# Patient Record
Sex: FEMALE | Race: WHITE | NOT HISPANIC OR LATINO | Employment: FULL TIME | URBAN - METROPOLITAN AREA
[De-identification: names, ages, dates, MRNs, and addresses within clinical notes are randomized per-mention and may not be internally consistent; named-entity substitution may affect disease eponyms.]

---

## 2017-04-27 ENCOUNTER — GENERIC CONVERSION - ENCOUNTER (OUTPATIENT)
Dept: OTHER | Facility: OTHER | Age: 57
End: 2017-04-27

## 2017-05-22 ENCOUNTER — TRANSCRIBE ORDERS (OUTPATIENT)
Dept: ADMINISTRATIVE | Facility: HOSPITAL | Age: 57
End: 2017-05-22

## 2017-05-22 ENCOUNTER — ALLSCRIPTS OFFICE VISIT (OUTPATIENT)
Dept: OTHER | Facility: OTHER | Age: 57
End: 2017-05-22

## 2017-05-22 DIAGNOSIS — R07.9 CHEST PAIN, UNSPECIFIED: Primary | ICD-10-CM

## 2017-05-22 DIAGNOSIS — R07.9 CHEST PAIN: ICD-10-CM

## 2017-05-25 ENCOUNTER — HOSPITAL ENCOUNTER (OUTPATIENT)
Dept: NON INVASIVE DIAGNOSTICS | Facility: CLINIC | Age: 57
Discharge: HOME | End: 2017-05-25
Payer: COMMERCIAL

## 2017-05-25 DIAGNOSIS — R07.9 CHEST PAIN, UNSPECIFIED: ICD-10-CM

## 2017-05-25 DIAGNOSIS — R07.9 CHEST PAIN: ICD-10-CM

## 2017-05-25 PROCEDURE — 93306 TTE W/DOPPLER COMPLETE: CPT

## 2017-05-25 PROCEDURE — 93017 CV STRESS TEST TRACING ONLY: CPT

## 2017-05-26 LAB
ARRHY DURING EX: NORMAL
CHEST PAIN STATEMENT: NORMAL
MAX DIASTOLIC BP: 90 MMHG
MAX HEART RATE: 162 BPM
MAX PREDICTED HEART RATE: 162 BPM
MAX. SYSTOLIC BP: 170 MMHG
PROTOCOL NAME: NORMAL
REASON FOR TERMINATION: NORMAL
TARGET HR FORMULA: NORMAL
TIME IN EXERCISE PHASE: 466 S

## 2017-11-14 DIAGNOSIS — Z12.31 ENCOUNTER FOR SCREENING MAMMOGRAM FOR MALIGNANT NEOPLASM OF BREAST: ICD-10-CM

## 2017-12-16 ENCOUNTER — GENERIC CONVERSION - ENCOUNTER (OUTPATIENT)
Dept: OBGYN CLINIC | Facility: CLINIC | Age: 57
End: 2017-12-16

## 2018-01-13 VITALS
BODY MASS INDEX: 32.66 KG/M2 | HEIGHT: 62 IN | DIASTOLIC BLOOD PRESSURE: 80 MMHG | WEIGHT: 177.5 LBS | HEART RATE: 84 BPM | SYSTOLIC BLOOD PRESSURE: 120 MMHG

## 2018-02-10 ENCOUNTER — OFFICE VISIT (OUTPATIENT)
Dept: OBGYN CLINIC | Facility: CLINIC | Age: 58
End: 2018-02-10
Payer: COMMERCIAL

## 2018-02-10 VITALS
SYSTOLIC BLOOD PRESSURE: 120 MMHG | HEIGHT: 62 IN | WEIGHT: 174 LBS | DIASTOLIC BLOOD PRESSURE: 78 MMHG | BODY MASS INDEX: 32.02 KG/M2

## 2018-02-10 DIAGNOSIS — Z01.419 GYNECOLOGIC EXAM NORMAL: Primary | ICD-10-CM

## 2018-02-10 DIAGNOSIS — N95.1 MENOPAUSAL SYMPTOMS: ICD-10-CM

## 2018-02-10 PROBLEM — R94.31 ABNORMAL EKG: Status: ACTIVE | Noted: 2017-05-22

## 2018-02-10 PROBLEM — R07.9 CHEST PAIN SYNDROME: Status: ACTIVE | Noted: 2017-05-22

## 2018-02-10 PROCEDURE — S0612 ANNUAL GYNECOLOGICAL EXAMINA: HCPCS | Performed by: PHYSICIAN ASSISTANT

## 2018-02-10 RX ORDER — ESTRADIOL 0.07 MG/D
1 FILM, EXTENDED RELEASE TRANSDERMAL
COMMUNITY
Start: 2017-06-07 | End: 2018-02-10 | Stop reason: SDUPTHER

## 2018-02-10 RX ORDER — ACYCLOVIR AND HYDROCORTISONE 50; 10 MG/G; MG/G
CREAM TOPICAL
COMMUNITY
Start: 2017-11-18

## 2018-02-10 RX ORDER — ALENDRONATE SODIUM 70 MG/1
TABLET ORAL
COMMUNITY
Start: 2017-12-21 | End: 2018-02-10 | Stop reason: SDUPTHER

## 2018-02-10 RX ORDER — ESTRADIOL 0.07 MG/D
1 FILM, EXTENDED RELEASE TRANSDERMAL 2 TIMES WEEKLY
Qty: 8 PATCH | Refills: 11 | Status: SHIPPED | OUTPATIENT
Start: 2018-02-12 | End: 2019-03-28 | Stop reason: SDUPTHER

## 2018-02-10 RX ORDER — ALENDRONATE SODIUM 70 MG/1
70 TABLET ORAL
Qty: 4 TABLET | Refills: 11 | Status: SHIPPED | OUTPATIENT
Start: 2018-02-10 | End: 2019-03-28

## 2018-02-10 NOTE — PROGRESS NOTES
Assessment/Plan:    No problem-specific Assessment & Plan notes found for this encounter  Diagnoses and all orders for this visit:    Gynecologic exam normal    Menopausal symptoms  -     estradiol (VIVELLE-DOT) 0 075 MG/24HR; Place 1 patch on the skin 2 (two) times a week  -     alendronate (FOSAMAX) 70 mg tablet; Take 1 tablet (70 mg total) by mouth every 7 days    Other orders  -     Discontinue: estradiol (VIVELLE-DOT) 0 075 MG/24HR; Place 1 patch on the skin  -     Discontinue: alendronate (FOSAMAX) 70 mg tablet;   -     XERESE 5-1 % CREA; Subjective:      Patient ID: Ryan Funk is a 60 y o  female  Pt for annual GYN exam   sx under control on current tx  No vaginal d/c or Gyn complaints  Occas vaginal irritation that resolves w hydrocortisone  Also w occas folliculitis  No problems w Ic  Pt is concerned w some recent increase in memory loss sx  I did short dementia test in the office today and pt had good recall and recognition  Pt will plan to keep diary and follow up with PCP as she is aware could be form many different sources casing memory loss  The following portions of the patient's history were reviewed and updated as appropriate: current medications, past family history, past medical history, past social history, past surgical history and problem list     Review of Systems      Objective:    Vitals:    02/10/18 1130   BP: 120/78     No past medical history on file  Past Surgical History:   Procedure Laterality Date     SECTION      CYST REMOVAL Right     RIGHT HAND    HAND SURGERY      HERNIA REPAIR      KNEE SURGERY      KNEE ARTHRODESIS    RIGHT    LYMPH NODE BIOPSY      ROTATOR CUFF REPAIR      X2    TOTAL ABDOMINAL HYSTERECTOMY       Social History     Social History    Marital status: /Civil Union     Spouse name: N/A    Number of children: N/A    Years of education: N/A     Occupational History    Not on file       Social History Main Topics    Smoking status: Former Smoker    Smokeless tobacco: Never Used    Alcohol use Yes      Comment: SOCIAL    Drug use: No    Sexual activity: Yes     Birth control/ protection: Post-menopausal     Other Topics Concern    Not on file     Social History Narrative        MONOGAMOUS RELATIONSHIP    RACIAL BACKGROUND    PRIMRY LANGUAGE ENGLISH     Family History   Problem Relation Age of Onset    Uterine cancer Mother      URINARY BLADDER    Heart disease Father      CARDIAC DISORDER    Cervical cancer Family      Menstrual History:  OB History      Para Term  AB Living    4 3     1 3    SAB TAB Ectopic Multiple Live Births                      Menarche age:15  No LMP recorded   Patient is postmenopausal       Physical Exam

## 2018-06-14 ENCOUNTER — OFFICE VISIT (OUTPATIENT)
Dept: CARDIOLOGY CLINIC | Facility: CLINIC | Age: 58
End: 2018-06-14
Payer: COMMERCIAL

## 2018-06-14 VITALS
HEIGHT: 62 IN | SYSTOLIC BLOOD PRESSURE: 110 MMHG | BODY MASS INDEX: 31.89 KG/M2 | DIASTOLIC BLOOD PRESSURE: 70 MMHG | HEART RATE: 94 BPM | WEIGHT: 173.3 LBS

## 2018-06-14 DIAGNOSIS — R07.9 CHEST PAIN SYNDROME: ICD-10-CM

## 2018-06-14 DIAGNOSIS — R94.31 ABNORMAL EKG: Primary | ICD-10-CM

## 2018-06-14 PROCEDURE — 99213 OFFICE O/P EST LOW 20 MIN: CPT | Performed by: INTERNAL MEDICINE

## 2018-06-14 NOTE — PROGRESS NOTES
Cardiology Follow Up    Verlie Seip  1960  [id number]  HEART & VASCULAR [facility]    1  Abnormal EKG     2  Chest pain syndrome         Interval History:  Patient continues to experience some chest discomfort, described as tightness  There is no radiation, usually triggered by emotional stress especially at work  She is actually going to resign in a few months  And she feels very relieved about that  She was exercising regularly but had to stop because of back problems  Denies any exertional symptoms including chest pain or dyspnea  Patient Active Problem List   Diagnosis    Abnormal EKG    Chest pain syndrome    Menopausal symptoms    Osteoporosis    Other fatigue    Gynecologic exam normal     Past Medical History:   Diagnosis Date    Abnormal ECG     Chest pain      Social History     Social History    Marital status: /Civil Union     Spouse name: N/A    Number of children: N/A    Years of education: N/A     Occupational History    Not on file       Social History Main Topics    Smoking status: Former Smoker    Smokeless tobacco: Never Used    Alcohol use Yes      Comment: SOCIAL    Drug use: No    Sexual activity: Yes     Birth control/ protection: Post-menopausal     Other Topics Concern    Not on file     Social History Narrative        MONOGAMOUS RELATIONSHIP    RACIAL BACKGROUND    PRIMRY LANGUAGE ENGLISH      Family History   Problem Relation Age of Onset    Uterine cancer Mother         URINARY BLADDER    Heart disease Father         CARDIAC DISORDER    Cervical cancer Family      Past Surgical History:   Procedure Laterality Date     SECTION      CYST REMOVAL Right     RIGHT HAND    HAND SURGERY      HERNIA REPAIR      KNEE SURGERY      KNEE ARTHRODESIS    RIGHT    LYMPH NODE BIOPSY      ROTATOR CUFF REPAIR      X2    TOTAL ABDOMINAL HYSTERECTOMY         Current Outpatient Prescriptions:     alendronate (FOSAMAX) 70 mg tablet, Take 1 tablet (70 mg total) by mouth every 7 days, Disp: 4 tablet, Rfl: 11    estradiol (VIVELLE-DOT) 0 075 MG/24HR, Place 1 patch on the skin 2 (two) times a week, Disp: 8 patch, Rfl: 11    XERESE 5-1 % CREA, , Disp: , Rfl:   No Known Allergies    Labs:  No visits with results within 6 Month(s) from this visit  Latest known visit with results is:   Hospital Outpatient Visit on 05/25/2017   Component Date Value    Protocol Name 05/25/2017 Leda Maciel Time In Exercise Phase 05/25/2017 466     MAX  SYSTOLIC BP 71/72/0964 300     Max Diastolic Bp 09/42/4468 90     Max Heart Rate 05/25/2017 162     Max Predicted Heart Rate 05/25/2017 162     Reason for Termination 05/25/2017 Fatigue     Test Indication 05/25/2017                      Value:Abnormal ECG  Screening for CAD      Target Hr Formular 05/25/2017 (220 - Age)*100%     Arrhy During Ex 05/25/2017 none     Chest Pain Statement 05/25/2017 none      Imaging: No results found  Review of Systems:  Review of Systems   Constitutional: Negative for activity change  Respiratory: Negative for shortness of breath, wheezing and stridor  Cardiovascular: Positive for chest pain  Negative for palpitations and leg swelling  Gastrointestinal: Negative for abdominal pain  Musculoskeletal: Positive for back pain  Neurological: Negative for dizziness and syncope  Psychiatric/Behavioral: The patient is nervous/anxious  Physical Exam:  Physical Exam   Constitutional: She appears well-developed  No distress  Neck: No JVD present  Cardiovascular: Normal rate, regular rhythm, normal heart sounds and intact distal pulses  Exam reveals no gallop and no friction rub  No murmur heard  Pulmonary/Chest: Effort normal and breath sounds normal  No respiratory distress  She has no wheezes  She has no rales  She exhibits no tenderness  Neurological: She is alert  Skin: She is not diaphoretic  Psychiatric: She has a normal mood and affect  Discussion/Summary:  Chest pain syndrome, very atypical, borderline abnormal EKG, previous noninvasive evaluation stress test last year he did 7 min on a Bo protocol  There were no symptoms or EKG criteria for ischemia  An echocardiogram revealed normal left systolic function with stage I diastolic dysfunction  Favor no interventions  Follow-up danelle Smith

## 2018-11-29 ENCOUNTER — TELEPHONE (OUTPATIENT)
Dept: OBGYN CLINIC | Facility: CLINIC | Age: 58
End: 2018-11-29

## 2018-11-29 DIAGNOSIS — Z12.31 SCREENING MAMMOGRAM, ENCOUNTER FOR: Primary | ICD-10-CM

## 2018-11-29 NOTE — PROGRESS NOTES
Faxed to pt work per her request  [facility] & [address]: Ana Cortes

## 2019-03-28 ENCOUNTER — ANNUAL EXAM (OUTPATIENT)
Dept: OBGYN CLINIC | Facility: CLINIC | Age: 59
End: 2019-03-28
Payer: COMMERCIAL

## 2019-03-28 VITALS
DIASTOLIC BLOOD PRESSURE: 80 MMHG | WEIGHT: 178 LBS | SYSTOLIC BLOOD PRESSURE: 134 MMHG | HEIGHT: 62 IN | BODY MASS INDEX: 32.76 KG/M2

## 2019-03-28 DIAGNOSIS — R10.2 PELVIC CRAMPING: ICD-10-CM

## 2019-03-28 DIAGNOSIS — N95.1 MENOPAUSAL SYMPTOMS: ICD-10-CM

## 2019-03-28 DIAGNOSIS — R10.2 SUPRAPUBIC PRESSURE: ICD-10-CM

## 2019-03-28 DIAGNOSIS — Z01.419 ENCOUNTER FOR GYNECOLOGICAL EXAMINATION (GENERAL) (ROUTINE) WITHOUT ABNORMAL FINDINGS: Primary | ICD-10-CM

## 2019-03-28 DIAGNOSIS — Z12.31 ENCOUNTER FOR SCREENING MAMMOGRAM FOR MALIGNANT NEOPLASM OF BREAST: ICD-10-CM

## 2019-03-28 LAB
SL AMB  POCT GLUCOSE, UA: NEGATIVE
SL AMB LEUKOCYTE ESTERASE,UA: NEGATIVE
SL AMB POCT BILIRUBIN,UA: NEGATIVE
SL AMB POCT BLOOD,UA: ABNORMAL
SL AMB POCT CLARITY,UA: ABNORMAL
SL AMB POCT COLOR,UA: YELLOW
SL AMB POCT KETONES,UA: NEGATIVE
SL AMB POCT NITRITE,UA: NEGATIVE
SL AMB POCT PH,UA: 5
SL AMB POCT SPECIFIC GRAVITY,UA: 1.03
SL AMB POCT URINE PROTEIN: ABNORMAL
SL AMB POCT UROBILINOGEN: NEGATIVE

## 2019-03-28 PROCEDURE — 81002 URINALYSIS NONAUTO W/O SCOPE: CPT | Performed by: PHYSICIAN ASSISTANT

## 2019-03-28 PROCEDURE — S0612 ANNUAL GYNECOLOGICAL EXAMINA: HCPCS | Performed by: PHYSICIAN ASSISTANT

## 2019-03-28 RX ORDER — ESTRADIOL 0.07 MG/D
1 FILM, EXTENDED RELEASE TRANSDERMAL WEEKLY
Qty: 8 PATCH | Refills: 6 | Status: SHIPPED | OUTPATIENT
Start: 2019-03-28 | End: 2020-05-18 | Stop reason: SDUPTHER

## 2019-03-28 NOTE — ASSESSMENT & PLAN NOTE
The current ASCCP guidelines were reviewed  Patient's last pap was 11/9/16 - WNL (-) HRHPV type 16/18 neg and therefore, a pap with HPV cotesting is not indicated at this time  I emphasized the importance of an annual pelvic and breast exam  Patient ok to defer pap today

## 2019-03-28 NOTE — ASSESSMENT & PLAN NOTE
Discussed risks associated with hormone therapy use over 5 years, specifically for breast cancer risk and thromoembolic risks  Patient states her QOL is significantly compromised off of hormone therapy and would like to continue despite risks  She is willing to decrease dosage  Patient will plan to use one patch/week and possibly consider once patch/2 weeks and evaluate her symptom management  Stressed the importance of the lowest effective dose for the shortest duration of time   Patient expressed understanding

## 2019-03-28 NOTE — PATIENT INSTRUCTIONS
Encourage at least 1200 mg calcium citrate + 2000 IUs vitamin D3 divided through diet and supplement throughout the day; Encourage 30-40 min weight bearing exercise most days of week

## 2019-03-28 NOTE — PROGRESS NOTES
Assessment/Plan   Diagnoses and all orders for this visit:    Encounter for gynecological examination (general) (routine) without abnormal findings  The current ASCCP guidelines were reviewed  Patient's last pap was 11/9/16 - WNL (-) HRHPV type 16/18 neg and therefore, a pap with HPV cotesting is not indicated at this time  I emphasized the importance of an annual pelvic and breast exam  Patient ok to defer pap today  Encounter for screening mammogram for malignant neoplasm of breast  -     Mammo screening bilateral w 3d & cad; Future  A yearly mammogram is recommended for breast cancer screening starting at age 36;    Suprapubic pressure  -     GP Urine Culture  -     US pelvis complete w transvaginal; Future  Pelvic cramping  -     US pelvis complete w transvaginal; Future  -     POCT urine dip  Reviewed blood in urine on in office dip today - will send urine out for culture  In the meantime, encourage increase in fluids and cranberry juice  Will treat based on results  If urine culture negative - patient should follow-up with PCP or urology to evaluate microscopic hematuria  Slip given for a pelvic ultrasound due to cramping if patient desires to get done    Menopausal symptoms  -     estradiol (VIVELLE-DOT) 0 075 MG/24HR; Place 1 patch on the skin once a week  Discussed risks associated with hormone therapy use over 5 years, specifically for breast cancer risk and thromoembolic risks  Patient states her QOL is significantly compromised off of hormone therapy and would like to continue despite risks  She is willing to decrease dosage  Patient will plan to use one patch/week and possibly consider once patch/2 weeks and evaluate her symptom management  Stressed the importance of the lowest effective dose for the shortest duration of time   Patient expressed understanding      Discussion  I have discussed the importance of monthly self-breast exams, exercise and healthy diet as well as adequate intake of calcium and vitamin D  Encourage at least 1200 mg calcium citrate + 2000 IUs vitamin D3 divided through diet and supplement throughout the day; Encourage 30-40 min weight bearing exercise most days of week  STD testing - declines  In addition, colon cancer screening with a colonoscopy is recommended starting at age 39 and reviewed benefits - patient is up to date with screening  I have reviewed the patient's risk factors for osteoporosis and ordered a dexa scan if applicable  Pt to check with insurance for coverage - patient will plan to follow-up with PCP as to when her last Dexa was, when to follow-up, and if medication is indicated  All questions have been answered to her satisfaction  RTO for APE or sooner if needed      Subjective     HPI   James Bowman is a 60 y o  female who presents for annual well woman exam    LMP - unsure; Denies  bleeding  No vulvar itch/burn; No vaginal itch/burn; No abn discharge or odor; No urinary sx - burning/pain/frequency/hematuria  (+) SBEs - no breast masses, asymmetry, nipple discharge or bleeding, changes in skin of breast, or breast tenderness bilaterally  No abd/pelvic pain or Has out of the ordinary; For the past month started with low back pain - given flexeril and ibuprofen at urgent care; Then about a couple weeks ago started with lower abdominal dull cramps - now just coming and going - started out constant - describes as a heaviness/pressure; No bowel issues and up to date with colonoscopy - done 2018 and WNL per patient   No menopausal symptoms: No hot flashes/night sweats - every once in a while but patch works well - sometimes not using 2 in one week; no problems with intercourse, vaginal dryness; sleeping well  Pt is sexually active in a mutually monog/ sexual relationship; No issues with intercourse; She declines std/hiv/hep testing; Feels safe at home  (+) PCP for routine Bw/care;     Last Pap - 11/9/16 - WNL (-) HRHPV type 16/18 neg; 10/27/14 - WNL (-) ECC (-) HRHPV type 16/18 negative  History of abnormal Pap smear: no  Last mammo - 16 - Birads 1 negative; patient just had mammogram done 2018 - WNL per patient at [facility]; History of abnormal mammogram: no  Last colonoscopy -  - WNL per patient  Last Dexa scan -  - osteoporosis - believes she had a more recent screen; has stopped the Fosamax at this time    Review of Systems   Constitutional: Negative for activity change, fatigue, fever and unexpected weight change  HENT: Negative for congestion, dental problem, sinus pressure and sinus pain  Eyes: Negative for visual disturbance  Respiratory: Negative for cough, shortness of breath and wheezing  Cardiovascular: Negative for chest pain and leg swelling  Gastrointestinal: Negative for abdominal distention, abdominal pain, blood in stool, constipation, diarrhea, nausea and vomiting  Endocrine: Negative for polydipsia  Genitourinary: Positive for pelvic pain (pressure)  Negative for difficulty urinating, dyspareunia, dysuria, frequency, hematuria, menstrual problem, urgency, vaginal bleeding, vaginal discharge and vaginal pain  Musculoskeletal: Negative for arthralgias and back pain  Allergic/Immunologic: Negative for environmental allergies  Neurological: Negative for dizziness, seizures and headaches  Psychiatric/Behavioral: Negative for dysphoric mood and sleep disturbance  The patient is not nervous/anxious          The following portions of the patient's history were reviewed and updated as appropriate: allergies, current medications, past family history, past medical history, past social history, past surgical history and problem list          OB History        4    Para   3    Term                AB   1    Living   3       SAB        TAB        Ectopic   1    Multiple        Live Births               Obstetric Comments   : 3 C/S and 1 ectopic pregnancy s/p salpingectomy             Past Medical History: Diagnosis Date    Abnormal ECG     Chest pain        Past Surgical History:   Procedure Laterality Date     SECTION      CYST REMOVAL Right     RIGHT HAND    ECTOPIC PREGNANCY SURGERY      HAND SURGERY      HERNIA REPAIR      KNEE SURGERY      KNEE ARTHRODESIS    RIGHT    LYMPH NODE BIOPSY      ROTATOR CUFF REPAIR      X2    TOTAL ABDOMINAL HYSTERECTOMY         Family History   Problem Relation Age of Onset    Cancer Mother         bladder    Heart disease Father         CARDIAC DISORDER    Cervical cancer Family        Social History     Socioeconomic History    Marital status: /Civil Union     Spouse name: Not on file    Number of children: Not on file    Years of education: Not on file    Highest education level: Not on file   Occupational History    Not on file   Social Needs    Financial resource strain: Not on file    Food insecurity:     Worry: Not on file     Inability: Not on file    Transportation needs:     Medical: Not on file     Non-medical: Not on file   Tobacco Use    Smoking status: Former Smoker    Smokeless tobacco: Never Used   Substance and Sexual Activity    Alcohol use: Yes     Comment: SOCIAL    Drug use: No    Sexual activity: Yes     Partners: Male     Birth control/protection: Post-menopausal   Lifestyle    Physical activity:     Days per week: Not on file     Minutes per session: Not on file    Stress: Not on file   Relationships    Social connections:     Talks on phone: Not on file     Gets together: Not on file     Attends [employer] service: Not on file     Active member of club or organization: Not on file     Attends meetings of clubs or organizations: Not on file     Relationship status: Not on file    Intimate partner violence:     Fear of current or ex partner: Not on file     Emotionally abused: Not on file     Physically abused: Not on file     Forced sexual activity: Not on file   Other Topics Concern    Not on file   Social History Narrative        MONOGAMOUS RELATIONSHIP    RACIAL BACKGROUND    PRIM LANGUAGE ENGLISH         Current Outpatient Medications:     estradiol (VIVELLE-DOT) 0 075 MG/24HR, Place 1 patch on the skin once a week, Disp: 8 patch, Rfl: 6    XERESE 5-1 % CREA, , Disp: , Rfl:     No Known Allergies    Objective   Vitals:    03/28/19 1029   BP: 134/80   BP Location: Left arm   Patient Position: Sitting   Cuff Size: Standard   Weight: 80 7 kg (178 lb)   Height: 5' 2" (1 575 m)     Physical Exam   Constitutional: She appears well-developed and well-nourished  No distress  Neck: No thyromegaly present  Cardiovascular: Normal rate, regular rhythm and normal heart sounds  No murmur heard  Pulmonary/Chest: Effort normal and breath sounds normal  No respiratory distress  She has no wheezes  Right breast exhibits no inverted nipple, no mass, no nipple discharge, no skin change and no tenderness  Left breast exhibits no inverted nipple, no mass, no nipple discharge, no skin change and no tenderness  Breasts are symmetrical    Abdominal: Soft  She exhibits no distension and no mass  There is tenderness (throughout lower abdomen L>R with deep palpation - patient states feels like a pressure)  Genitourinary: Vagina normal and uterus normal  Rectal exam shows no mass, no tenderness, anal tone normal and guaiac negative stool  Pelvic exam was performed with patient supine  There is no rash, tenderness or lesion on the right labia  There is no rash, tenderness or lesion on the left labia  Uterus is not deviated, not enlarged, not fixed and not tender  Cervix exhibits no motion tenderness, no discharge and no friability  Right adnexum displays no mass, no tenderness and no fullness  Left adnexum displays no mass, no tenderness and no fullness  No erythema, tenderness or bleeding in the vagina  No foreign body in the vagina  No vaginal discharge found  Musculoskeletal: She exhibits no edema     Lymphadenopathy: She has no cervical adenopathy  She has no axillary adenopathy  Right: No inguinal adenopathy present  Left: No inguinal adenopathy present  Neurological: She is alert  Skin: Skin is warm  She is not diaphoretic  Psychiatric: She has a normal mood and affect  Her behavior is normal    Vitals reviewed

## 2019-03-29 ENCOUNTER — HOSPITAL ENCOUNTER (OUTPATIENT)
Dept: RADIOLOGY | Facility: HOSPITAL | Age: 59
Discharge: HOME | End: 2019-03-29
Payer: COMMERCIAL

## 2019-03-29 DIAGNOSIS — R10.2 SUPRAPUBIC PRESSURE: ICD-10-CM

## 2019-03-29 DIAGNOSIS — R10.2 PELVIC CRAMPING: ICD-10-CM

## 2019-03-29 PROCEDURE — 76856 US EXAM PELVIC COMPLETE: CPT

## 2019-03-29 PROCEDURE — 76830 TRANSVAGINAL US NON-OB: CPT

## 2019-03-30 LAB — BACTERIA UR CULT: NO GROWTH

## 2019-04-03 ENCOUNTER — TELEPHONE (OUTPATIENT)
Dept: OBGYN CLINIC | Facility: CLINIC | Age: 59
End: 2019-04-03

## 2019-04-17 ENCOUNTER — TELEPHONE (OUTPATIENT)
Dept: OBGYN CLINIC | Facility: CLINIC | Age: 59
End: 2019-04-17

## 2019-09-13 DIAGNOSIS — B37.3 VAGINAL YEAST INFECTION: Primary | ICD-10-CM

## 2019-09-13 RX ORDER — FLUCONAZOLE 150 MG/1
150 TABLET ORAL EVERY OTHER DAY
Qty: 2 TABLET | Refills: 0 | Status: SHIPPED | OUTPATIENT
Start: 2019-09-13 | End: 2019-09-16

## 2019-09-17 ENCOUNTER — TELEPHONE (OUTPATIENT)
Dept: OBGYN CLINIC | Facility: CLINIC | Age: 59
End: 2019-09-17

## 2019-09-17 NOTE — TELEPHONE ENCOUNTER
I would recommend using coconut oil  May be vaginal dryness  If still having symptoms by the end of the week can consider Terconazole or an appt to further evaluate

## 2019-09-17 NOTE — TELEPHONE ENCOUNTER
Spoke with pt  Pt took Diflucan Friday night and Sunday  Symptoms on Monday seemed to get better currently experiencing redness and itchiness  Denies any odor or discharge  Offered apt for tomorrow at 1400 pt is not able to make that time, just started a new job can not request off  Available for a PM apt  Is there anything you would advise or should we schedule an apt?

## 2019-09-18 NOTE — TELEPHONE ENCOUNTER
Spoke with pt  Pt asked if she could use hydrocortizone for relief, advised to apply externally only  Pt is aware if symptoms do not get better by Friday to give the office call to discuss considering medication (Terconazole) or an apt

## 2019-11-25 ENCOUNTER — TELEPHONE (OUTPATIENT)
Dept: OBGYN CLINIC | Facility: CLINIC | Age: 59
End: 2019-11-25

## 2019-11-25 DIAGNOSIS — Z12.31 ENCOUNTER FOR SCREENING MAMMOGRAM FOR BREAST CANCER: Primary | ICD-10-CM

## 2020-05-18 DIAGNOSIS — N95.1 MENOPAUSAL SYMPTOMS: ICD-10-CM

## 2020-05-19 RX ORDER — ESTRADIOL 0.07 MG/D
1 FILM, EXTENDED RELEASE TRANSDERMAL WEEKLY
Qty: 8 PATCH | Refills: 3 | Status: SHIPPED | OUTPATIENT
Start: 2020-05-19 | End: 2021-01-12 | Stop reason: SDUPTHER

## 2020-06-10 ENCOUNTER — TELEPHONE (OUTPATIENT)
Dept: CARDIOLOGY CLINIC | Facility: CLINIC | Age: 60
End: 2020-06-10

## 2020-06-11 ENCOUNTER — OFFICE VISIT (OUTPATIENT)
Dept: CARDIOLOGY CLINIC | Facility: CLINIC | Age: 60
End: 2020-06-11
Payer: COMMERCIAL

## 2020-06-11 VITALS
WEIGHT: 184.4 LBS | HEIGHT: 62 IN | DIASTOLIC BLOOD PRESSURE: 72 MMHG | HEART RATE: 78 BPM | SYSTOLIC BLOOD PRESSURE: 120 MMHG | TEMPERATURE: 98.3 F | BODY MASS INDEX: 33.93 KG/M2

## 2020-06-11 DIAGNOSIS — R06.00 DYSPNEA ON EXERTION: ICD-10-CM

## 2020-06-11 DIAGNOSIS — R94.31 ABNORMAL EKG: Primary | ICD-10-CM

## 2020-06-11 DIAGNOSIS — R07.9 CHEST PAIN SYNDROME: ICD-10-CM

## 2020-06-11 PROBLEM — R06.09 DYSPNEA ON EXERTION: Status: ACTIVE | Noted: 2020-06-11

## 2020-06-11 PROCEDURE — 93000 ELECTROCARDIOGRAM COMPLETE: CPT | Performed by: INTERNAL MEDICINE

## 2020-06-11 PROCEDURE — 99213 OFFICE O/P EST LOW 20 MIN: CPT | Performed by: INTERNAL MEDICINE

## 2020-07-22 ENCOUNTER — HOSPITAL ENCOUNTER (OUTPATIENT)
Dept: NON INVASIVE DIAGNOSTICS | Facility: CLINIC | Age: 60
Discharge: HOME | End: 2020-07-22
Payer: COMMERCIAL

## 2020-07-22 ENCOUNTER — TELEPHONE (OUTPATIENT)
Dept: CARDIOLOGY CLINIC | Facility: CLINIC | Age: 60
End: 2020-07-22

## 2020-07-22 DIAGNOSIS — R06.00 DYSPNEA ON EXERTION: ICD-10-CM

## 2020-07-22 DIAGNOSIS — R07.9 CHEST PAIN SYNDROME: ICD-10-CM

## 2020-07-22 LAB
ARRHY DURING EX: NORMAL
CHEST PAIN STATEMENT: NORMAL
MAX DIASTOLIC BP: 80 MMHG
MAX HEART RATE: 148 BPM
MAX PREDICTED HEART RATE: 159 BPM
MAX. SYSTOLIC BP: 158 MMHG
PROTOCOL NAME: NORMAL
REASON FOR TERMINATION: NORMAL
TARGET HR FORMULA: NORMAL
TIME IN EXERCISE PHASE: NORMAL

## 2020-07-22 PROCEDURE — 93351 STRESS TTE COMPLETE: CPT | Performed by: INTERNAL MEDICINE

## 2020-07-22 PROCEDURE — 93350 STRESS TTE ONLY: CPT

## 2020-07-22 NOTE — TELEPHONE ENCOUNTER
----- Message from Nicole Aguero MD sent at 7/22/2020 12:09 PM EDT -----  Please call the patient regarding her normal result

## 2020-11-19 ENCOUNTER — TELEMEDICINE (OUTPATIENT)
Dept: FAMILY MEDICINE CLINIC | Facility: CLINIC | Age: 60
End: 2020-11-19
Payer: COMMERCIAL

## 2020-11-19 DIAGNOSIS — J01.80 ACUTE NON-RECURRENT SINUSITIS OF OTHER SINUS: ICD-10-CM

## 2020-11-19 DIAGNOSIS — J01.90 ACUTE SINUSITIS, RECURRENCE NOT SPECIFIED, UNSPECIFIED LOCATION: Primary | ICD-10-CM

## 2020-11-19 PROCEDURE — 1036F TOBACCO NON-USER: CPT | Performed by: FAMILY MEDICINE

## 2020-11-19 PROCEDURE — 99213 OFFICE O/P EST LOW 20 MIN: CPT | Performed by: FAMILY MEDICINE

## 2020-11-19 RX ORDER — AMOXICILLIN 875 MG/1
875 TABLET, COATED ORAL 2 TIMES DAILY
Qty: 20 TABLET | Refills: 0 | Status: SHIPPED | OUTPATIENT
Start: 2020-11-19 | End: 2020-11-29

## 2021-01-08 NOTE — PROGRESS NOTES
Assessment/Plan   Problem List Items Addressed This Visit        Musculoskeletal and Integument    Osteoporosis    Relevant Orders    DXA bone density spine hip and pelvis       Other    Menopausal symptoms    Relevant Medications    estradiol (Vivelle-Dot) 0 075 MG/24HR    Gynecologic exam normal - Primary     Pap deferred secondary to history of normal pap smears and post hysterectomy  Reviewed HRT  Patient would like to continue estradiol patch at this time  Aware of clotting risks  Reviewed recommendation for smallest dose for shortest duration possible  Also reassured patient that if she does decide to trial off, she may get hot flashes as her body initially adjusts to lower estrogen levels but will improve with time  Patient will consider  Reviewed mammogram guidelines, continue yearly mammograms  Script given  Reviewed osteoporosis  Recommend Calcium 1200mg in two divided doses  Vitamin D3 2,000-4,000 IU daily   Weight bearing exercises 3-4x's a week for 30-40min  Script for repeat DEXA given  Script for routine blood work given, per patient request    Return to office for annual or as needed  Other Visit Diagnoses     Encounter for screening mammogram for malignant neoplasm of breast        Relevant Orders    Mammo screening bilateral w 3d & cad    Fatigue, unspecified type        Relevant Orders    CBC and differential    Comprehensive metabolic panel    T4, free    TSH, 3rd generation    Vitamin D 25 hydroxy    Encounter for lipid screening for cardiovascular disease        Relevant Orders    Lipid panel    Vitamin D deficiency        Relevant Orders    Vitamin D 25 hydroxy          Subjective:     Patient ID: Karthik Elias is a 57 y o  y o  female  HPI  56 yo seen for annual exam  Currently on Vivelle-Dot patch (estradiol 0 075 mg)  Tolerates well, Has tried to go a few days without and typically gets hot flashes  Does admit to increased fatigue   History of Vitamin D deficiency, due for routine blood work, requests script  LMP: 1999 after BRIAN post complication from ectopic pregnancy  Last pap: 2016 NILM (-)HRHPV  Last mammogram: 2019 BIRADS 1: Negative  Last colonoscopy: 2010  Done again 3 or so years ago at [facility] will request records  Last DEXA scan: 2014 Osteoporosis  The following portions of the patient's history were reviewed and updated as appropriate:   She  has a past medical history of Abnormal ECG, Chest pain, and History of mammogram (2017)  She   Patient Active Problem List    Diagnosis Date Noted    Gynecologic exam normal 2021    Other acute sinusitis 2020    Dyspnea on exertion 2020    Encounter for gynecological examination (general) (routine) without abnormal findings 2019    Chest pain syndrome 2017    Other fatigue 2016    Osteoporosis 02/10/2016    Menopausal symptoms 2016     She  has a past surgical history that includes  section; Cyst Removal (Right); Lymph node biopsy; Hand surgery (Right); Hernia repair; Knee surgery; Rotator cuff repair; Total abdominal hysterectomy; and Ectopic pregnancy surgery  Her family history includes Cancer in her mother; Cervical cancer in her family; Heart disease in her father  She  reports that she has quit smoking  She has never used smokeless tobacco  She reports current alcohol use  She reports that she does not use drugs  Current Outpatient Medications   Medication Sig Dispense Refill    estradiol (Vivelle-Dot) 0 075 MG/24HR Place 1 patch on the skin once a week 8 patch 3    meloxicam (MOBIC) 15 mg tablet       XERESE 5-1 % CREA        No current facility-administered medications for this visit  She has No Known Allergies       Menstrual History:  OB History        4    Para   3    Term                AB   1    Living   3       SAB        TAB        Ectopic   1    Multiple        Live Births Obstetric Comments   : 3 C/S and 1 ectopic pregnancy s/p salpingectomy              No LMP recorded  Patient is postmenopausal          Review of Systems   Constitutional: Positive for fatigue  Negative for fever and unexpected weight change  HENT: Negative for dental problem and sinus pressure  Eyes: Negative for visual disturbance  Respiratory: Negative for cough, shortness of breath and wheezing  Cardiovascular: Negative for chest pain  Gastrointestinal: Negative for abdominal pain, blood in stool, constipation, diarrhea, nausea and vomiting  Endocrine: Negative for polydipsia  Genitourinary: Negative for difficulty urinating, dyspareunia, dysuria, frequency, hematuria, pelvic pain and urgency  Musculoskeletal: Negative for arthralgias and back pain  Neurological: Negative for dizziness, seizures, light-headedness and headaches  Psychiatric/Behavioral: Negative for suicidal ideas  The patient is not nervous/anxious  Objective:  Vitals:    21 0947   BP: 122/82   BP Location: Left arm   Patient Position: Sitting   Cuff Size: Standard   Weight: 82 6 kg (182 lb)   Height: 5' 2" (1 575 m)      Physical Exam  Constitutional:       Appearance: Normal appearance  She is well-developed  Genitourinary:      Pelvic exam was performed with patient supine  Vulva, urethra, bladder, vagina and rectum normal       No vulval condylomata, lesion, tenderness, ulcerations, Bartholin's cyst or rash noted  No signs of labial injury  No labial fusion  No inguinal adenopathy present in the right or left side  No urethral prolapse, pain, swelling, tenderness, caruncle, mass or diverticulum present  Bladder is not distended or tender  No signs of injury or lesions in the vagina  No vaginal discharge, erythema, tenderness or bleeding  Cervix is absent  Uterus is absent  No right or left adnexal mass present        Right adnexa not tender or full       Left adnexa not tender or full  Rectum:      Guaiac result negative  No rectal mass, anal fissure, tenderness, external hemorrhoid, internal hemorrhoid or abnormal anal tone  HENT:      Head: Normocephalic and atraumatic  Neck:      Thyroid: No thyromegaly  Cardiovascular:      Rate and Rhythm: Normal rate and regular rhythm  Heart sounds: Normal heart sounds  No murmur  No friction rub  No gallop  Pulmonary:      Effort: Pulmonary effort is normal  No respiratory distress  Breath sounds: Normal breath sounds  No wheezing  Chest:      Breasts: Breasts are symmetrical          Right: Normal  No swelling, bleeding, inverted nipple, mass, nipple discharge, skin change or tenderness  Left: Normal  No swelling, bleeding, inverted nipple, mass, nipple discharge, skin change or tenderness  Abdominal:      General: There is no distension  Palpations: Abdomen is soft  There is no mass  Tenderness: There is no abdominal tenderness  There is no guarding or rebound  Hernia: No hernia is present  Lymphadenopathy:      Cervical: No cervical adenopathy  Upper Body:      Right upper body: No supraclavicular, axillary or pectoral adenopathy  Left upper body: No supraclavicular, axillary or pectoral adenopathy  Lower Body: No right inguinal adenopathy  No left inguinal adenopathy  Neurological:      Mental Status: She is alert and oriented to person, place, and time  Skin:     General: Skin is warm and dry     Psychiatric:         Behavior: Behavior normal

## 2021-01-11 RX ORDER — MELOXICAM 15 MG/1
TABLET ORAL
COMMUNITY
Start: 2020-10-15

## 2021-01-12 ENCOUNTER — TELEPHONE (OUTPATIENT)
Dept: ADMINISTRATIVE | Facility: OTHER | Age: 61
End: 2021-01-12

## 2021-01-12 ENCOUNTER — ANNUAL EXAM (OUTPATIENT)
Dept: OBGYN CLINIC | Facility: CLINIC | Age: 61
End: 2021-01-12
Payer: COMMERCIAL

## 2021-01-12 VITALS
HEIGHT: 62 IN | WEIGHT: 182 LBS | DIASTOLIC BLOOD PRESSURE: 82 MMHG | BODY MASS INDEX: 33.49 KG/M2 | SYSTOLIC BLOOD PRESSURE: 122 MMHG

## 2021-01-12 DIAGNOSIS — Z13.6 ENCOUNTER FOR LIPID SCREENING FOR CARDIOVASCULAR DISEASE: ICD-10-CM

## 2021-01-12 DIAGNOSIS — M81.0 OSTEOPOROSIS WITHOUT CURRENT PATHOLOGICAL FRACTURE, UNSPECIFIED OSTEOPOROSIS TYPE: ICD-10-CM

## 2021-01-12 DIAGNOSIS — R53.83 FATIGUE, UNSPECIFIED TYPE: ICD-10-CM

## 2021-01-12 DIAGNOSIS — Z12.31 ENCOUNTER FOR SCREENING MAMMOGRAM FOR MALIGNANT NEOPLASM OF BREAST: ICD-10-CM

## 2021-01-12 DIAGNOSIS — N95.1 MENOPAUSAL SYMPTOMS: ICD-10-CM

## 2021-01-12 DIAGNOSIS — Z13.220 ENCOUNTER FOR LIPID SCREENING FOR CARDIOVASCULAR DISEASE: ICD-10-CM

## 2021-01-12 DIAGNOSIS — E55.9 VITAMIN D DEFICIENCY: ICD-10-CM

## 2021-01-12 DIAGNOSIS — Z01.419 GYNECOLOGIC EXAM NORMAL: Primary | ICD-10-CM

## 2021-01-12 PROCEDURE — S0612 ANNUAL GYNECOLOGICAL EXAMINA: HCPCS | Performed by: PHYSICIAN ASSISTANT

## 2021-01-12 RX ORDER — ESTRADIOL 0.07 MG/D
1 FILM, EXTENDED RELEASE TRANSDERMAL WEEKLY
Qty: 8 PATCH | Refills: 3 | Status: SHIPPED | OUTPATIENT
Start: 2021-01-12 | End: 2022-06-06 | Stop reason: SDUPTHER

## 2021-01-12 NOTE — LETTER
Procedure Request Form: Colonoscopy      Date Requested: 21  Patient: Abby Castled  Patient : 1960   Referring Provider: Behzad Rojas, MD        Date of Procedure ______________________________       The above patient has informed us that they have completed their   most recent Colonoscopy at your facility  Please complete   this form and attach all corresponding procedure reports/results  Comments __________________________________________________________  ____________________________________________________________________  ____________________________________________________________________  ____________________________________________________________________    Facility Completing Procedure _________________________________________    Form Completed By (print name) _______________________________________      Signature __________________________________________________________      These reports are needed for  compliance    Please fax this completed form and a copy of the procedure report to our office located at [place] [address] as soon as possible to [phone] attention Taras Yan: Phone [phone]    We thank you for your assistance in treating our mutual patient

## 2021-01-12 NOTE — TELEPHONE ENCOUNTER
Upon review of the In Basket request and the patient's chart, initial outreach has been made via fax, please see Contacts section for details       Thank you  Rhoda Dougherty

## 2021-01-12 NOTE — TELEPHONE ENCOUNTER
Upon review of the In Basket request we were able to locate, review, and update the patient chart as requested for CRC: Colonoscopy  Any additional questions or concerns should be emailed to the Practice Liaisons via [email or web address] email, please do not reply via In Basket      Thank you  Minor Maciel

## 2021-01-12 NOTE — PATIENT INSTRUCTIONS
Recommend Calcium 1200mg in two divided doses  Vitamin D3 2,000-4,000 IU daily   Weight bearing exercises 3-4x's a week for 30-40min

## 2021-01-12 NOTE — TELEPHONE ENCOUNTER
----- Message from Barrie Phillips PA-C sent at 1/12/2021  9:56 AM EST -----  01/12/21 9:56 AM     Hello, our patient James Hudson had a Colonoscopy  completed/performed  Please assist in updating the patient chart by making an External outreach to [facility]        Thank you,   Barrie Phillips PA-C   [address] [place] [address]

## 2021-01-18 PROBLEM — Z01.419 GYNECOLOGIC EXAM NORMAL: Status: ACTIVE | Noted: 2021-01-18

## 2021-01-19 NOTE — ASSESSMENT & PLAN NOTE
Pap deferred secondary to history of normal pap smears and post hysterectomy  Reviewed HRT  Patient would like to continue estradiol patch at this time  Aware of clotting risks  Reviewed recommendation for smallest dose for shortest duration possible  Also reassured patient that if she does decide to trial off, she may get hot flashes as her body initially adjusts to lower estrogen levels but will improve with time  Patient will consider  Reviewed mammogram guidelines, continue yearly mammograms  Script given  Reviewed osteoporosis  Recommend Calcium 1200mg in two divided doses  Vitamin D3 2,000-4,000 IU daily   Weight bearing exercises 3-4x's a week for 30-40min  Script for repeat DEXA given  Script for routine blood work given, per patient request    Return to office for annual or as needed

## 2021-01-30 ENCOUNTER — LAB (OUTPATIENT)
Dept: LAB | Facility: CLINIC | Age: 61
End: 2021-01-30
Payer: COMMERCIAL

## 2021-01-30 DIAGNOSIS — Z13.6 ENCOUNTER FOR LIPID SCREENING FOR CARDIOVASCULAR DISEASE: ICD-10-CM

## 2021-01-30 DIAGNOSIS — R53.83 FATIGUE, UNSPECIFIED TYPE: ICD-10-CM

## 2021-01-30 DIAGNOSIS — E55.9 VITAMIN D DEFICIENCY: ICD-10-CM

## 2021-01-30 DIAGNOSIS — Z13.220 ENCOUNTER FOR LIPID SCREENING FOR CARDIOVASCULAR DISEASE: ICD-10-CM

## 2021-01-30 LAB
25(OH)D3 SERPL-MCNC: 16.9 NG/ML (ref 30–100)
ALBUMIN SERPL BCP-MCNC: 4 G/DL (ref 3.5–5)
ALP SERPL-CCNC: 61 U/L (ref 46–116)
ALT SERPL W P-5'-P-CCNC: 27 U/L (ref 12–78)
ANION GAP SERPL CALCULATED.3IONS-SCNC: 7 MMOL/L (ref 4–13)
AST SERPL W P-5'-P-CCNC: 15 U/L (ref 5–45)
BASOPHILS # BLD AUTO: 0.02 THOUSANDS/ΜL (ref 0–0.1)
BASOPHILS NFR BLD AUTO: 1 % (ref 0–1)
BILIRUB SERPL-MCNC: 0.25 MG/DL (ref 0.2–1)
BUN SERPL-MCNC: 15 MG/DL (ref 5–25)
CALCIUM SERPL-MCNC: 8.8 MG/DL (ref 8.3–10.1)
CHLORIDE SERPL-SCNC: 106 MMOL/L (ref 100–108)
CHOLEST SERPL-MCNC: 228 MG/DL (ref 50–200)
CO2 SERPL-SCNC: 30 MMOL/L (ref 21–32)
CREAT SERPL-MCNC: 0.59 MG/DL (ref 0.6–1.3)
EOSINOPHIL # BLD AUTO: 0.09 THOUSAND/ΜL (ref 0–0.61)
EOSINOPHIL NFR BLD AUTO: 2 % (ref 0–6)
ERYTHROCYTE [DISTWIDTH] IN BLOOD BY AUTOMATED COUNT: 15.4 % (ref 11.6–15.1)
GFR SERPL CREATININE-BSD FRML MDRD: 99 ML/MIN/1.73SQ M
GLUCOSE P FAST SERPL-MCNC: 85 MG/DL (ref 65–99)
HCT VFR BLD AUTO: 43.1 % (ref 34.8–46.1)
HDLC SERPL-MCNC: 61 MG/DL
HGB BLD-MCNC: 13.1 G/DL (ref 11.5–15.4)
IMM GRANULOCYTES # BLD AUTO: 0.01 THOUSAND/UL (ref 0–0.2)
IMM GRANULOCYTES NFR BLD AUTO: 0 % (ref 0–2)
LDLC SERPL CALC-MCNC: 145 MG/DL (ref 0–100)
LYMPHOCYTES # BLD AUTO: 1.26 THOUSANDS/ΜL (ref 0.6–4.47)
LYMPHOCYTES NFR BLD AUTO: 34 % (ref 14–44)
MCH RBC QN AUTO: 25 PG (ref 26.8–34.3)
MCHC RBC AUTO-ENTMCNC: 30.4 G/DL (ref 31.4–37.4)
MCV RBC AUTO: 82 FL (ref 82–98)
MONOCYTES # BLD AUTO: 0.29 THOUSAND/ΜL (ref 0.17–1.22)
MONOCYTES NFR BLD AUTO: 8 % (ref 4–12)
NEUTROPHILS # BLD AUTO: 2.06 THOUSANDS/ΜL (ref 1.85–7.62)
NEUTS SEG NFR BLD AUTO: 55 % (ref 43–75)
NONHDLC SERPL-MCNC: 167 MG/DL
NRBC BLD AUTO-RTO: 0 /100 WBCS
PLATELET # BLD AUTO: 202 THOUSANDS/UL (ref 149–390)
PMV BLD AUTO: 11.5 FL (ref 8.9–12.7)
POTASSIUM SERPL-SCNC: 4.1 MMOL/L (ref 3.5–5.3)
PROT SERPL-MCNC: 7.3 G/DL (ref 6.4–8.2)
RBC # BLD AUTO: 5.23 MILLION/UL (ref 3.81–5.12)
SODIUM SERPL-SCNC: 143 MMOL/L (ref 136–145)
T4 FREE SERPL-MCNC: 0.92 NG/DL (ref 0.76–1.46)
TRIGL SERPL-MCNC: 111 MG/DL
TSH SERPL DL<=0.05 MIU/L-ACNC: 4.09 UIU/ML (ref 0.36–3.74)
WBC # BLD AUTO: 3.73 THOUSAND/UL (ref 4.31–10.16)

## 2021-01-30 PROCEDURE — 84439 ASSAY OF FREE THYROXINE: CPT

## 2021-01-30 PROCEDURE — 80061 LIPID PANEL: CPT

## 2021-01-30 PROCEDURE — 82306 VITAMIN D 25 HYDROXY: CPT

## 2021-01-30 PROCEDURE — 80053 COMPREHEN METABOLIC PANEL: CPT

## 2021-01-30 PROCEDURE — 84443 ASSAY THYROID STIM HORMONE: CPT

## 2021-01-30 PROCEDURE — 36415 COLL VENOUS BLD VENIPUNCTURE: CPT

## 2021-01-30 PROCEDURE — 85025 COMPLETE CBC W/AUTO DIFF WBC: CPT

## 2021-02-03 ENCOUNTER — TELEPHONE (OUTPATIENT)
Dept: FAMILY MEDICINE CLINIC | Facility: CLINIC | Age: 61
End: 2021-02-03

## 2021-02-03 DIAGNOSIS — E78.00 HYPERCHOLESTEREMIA: Primary | ICD-10-CM

## 2021-02-03 DIAGNOSIS — E78.00 HYPERCHOLESTEREMIA: ICD-10-CM

## 2021-02-03 DIAGNOSIS — R79.89 ABNORMAL TSH: ICD-10-CM

## 2021-02-03 DIAGNOSIS — E55.9 VITAMIN D DEFICIENCY: ICD-10-CM

## 2021-02-03 DIAGNOSIS — E55.9 VITAMIN D DEFICIENCY: Primary | ICD-10-CM

## 2021-02-03 RX ORDER — ERGOCALCIFEROL 1.25 MG/1
50000 CAPSULE ORAL
COMMUNITY
End: 2021-02-03 | Stop reason: SDUPTHER

## 2021-02-03 RX ORDER — ATORVASTATIN CALCIUM 20 MG/1
20 TABLET, FILM COATED ORAL DAILY
COMMUNITY
End: 2021-02-03 | Stop reason: SDUPTHER

## 2021-02-04 RX ORDER — ATORVASTATIN CALCIUM 20 MG/1
20 TABLET, FILM COATED ORAL DAILY
Qty: 30 TABLET | Refills: 2 | Status: SHIPPED | OUTPATIENT
Start: 2021-02-04 | End: 2022-01-20 | Stop reason: SDUPTHER

## 2021-02-04 RX ORDER — ERGOCALCIFEROL 1.25 MG/1
50000 CAPSULE ORAL WEEKLY
Qty: 12 CAPSULE | Refills: 0 | Status: SHIPPED | OUTPATIENT
Start: 2021-02-04

## 2021-04-01 DIAGNOSIS — Z23 ENCOUNTER FOR IMMUNIZATION: ICD-10-CM

## 2021-04-12 ENCOUNTER — TELEMEDICINE (OUTPATIENT)
Dept: FAMILY MEDICINE CLINIC | Facility: CLINIC | Age: 61
End: 2021-04-12
Payer: COMMERCIAL

## 2021-04-12 VITALS — WEIGHT: 177 LBS | BODY MASS INDEX: 32.57 KG/M2 | HEIGHT: 62 IN

## 2021-04-12 DIAGNOSIS — J01.00 ACUTE NON-RECURRENT MAXILLARY SINUSITIS: Primary | ICD-10-CM

## 2021-04-12 PROCEDURE — 3008F BODY MASS INDEX DOCD: CPT | Performed by: INTERNAL MEDICINE

## 2021-04-12 PROCEDURE — 3725F SCREEN DEPRESSION PERFORMED: CPT | Performed by: INTERNAL MEDICINE

## 2021-04-12 PROCEDURE — 1036F TOBACCO NON-USER: CPT | Performed by: INTERNAL MEDICINE

## 2021-04-12 PROCEDURE — 99213 OFFICE O/P EST LOW 20 MIN: CPT | Performed by: INTERNAL MEDICINE

## 2021-04-12 RX ORDER — AMOXICILLIN 500 MG/1
500 CAPSULE ORAL EVERY 8 HOURS SCHEDULED
Qty: 15 CAPSULE | Refills: 0 | Status: SHIPPED | OUTPATIENT
Start: 2021-04-12 | End: 2021-04-17

## 2021-04-12 NOTE — PROGRESS NOTES
Virtual Brief Visit    Assessment/Plan:    1  Acute rhinosinusitis  - Continue supportive management with saline nasal spray    - Discussed with her that most rhinosinusitits are viral in origin but since sx have been persistent for more than 7 days will start on antibiotics   - Last sinusitis 2 years ago  No recent antibiotic use or concern for MDRO, or immunocomprise per chart review  - Abx - Amoxicllin 500 mg Q8H for 5 days    - Follow up with PCP in 2 weeks            Reason for visit is   Chief Complaint   Patient presents with    Sinusitis     sinus headache and pressure/ green thick mucus and blood coming out of nose for the past week        Encounter provider Nicole Rodriguez MD    Provider located at [address] [place] [id number] [phone]    Recent Visits  No visits were found meeting these conditions  Showing recent visits within past 7 days and meeting all other requirements     Today's Visits  Date Type Provider Dept   04/12/21 Telemedicine Nicole Rodriguez MD [address] [address] today's visits and meeting all other requirements     Future Appointments  No visits were found meeting these conditions  Showing future appointments within next 150 days and meeting all other requirements        After connecting through regular telephone call  and patient was informed that this is not a secure, HIPAA-compliant platform  She agrees to proceed  , the patient was identified by Ap Hudson was informed that this is a telemedicine visit and that the visit is being conducted through telephone  My office door was closed  No one else was in the room  She acknowledged consent and understanding of privacy and security of the platform  The patient has agreed to participate and understands she can discontinue the visit at any time  Patient is aware this is a billable service  Subjective/ HPI     James Edwards is a 57 y o  female  With c/o sinus pressure, and  Thick green yellow nasal discharge with an onset of > 7 days  She has noticed blood tinged discharge from both nostrils when clearing her mucous, but denies any lily epistaxis otherwise  Has not had any fever, chills, nausea, vomiting, CP, SOB, difficulty breathing  Received 2 shots of COVID 19 vaccination  Has no eye discharge or erythema or signs of allergies  Has a h/o sinusitis in the past , last episode was approx 2 years ago but does not remember which abx was used  No known medication allergies  No recent antibiotic use  Has been using Saline nasal spray, and tried decongestant which made her feel worse so stopped use  Past Medical History:   Diagnosis Date    Abnormal ECG     Chest pain     History of mammogram 2017       Past Surgical History:   Procedure Laterality Date     SECTION      CYST REMOVAL Right     RIGHT HAND    ECTOPIC PREGNANCY SURGERY      HAND SURGERY Right     HERNIA REPAIR      KNEE SURGERY      KNEE ARTHRODESIS    RIGHT    LYMPH NODE BIOPSY      ROTATOR CUFF REPAIR      X2    TOTAL ABDOMINAL HYSTERECTOMY         Current Outpatient Medications   Medication Sig Dispense Refill    atorvastatin (LIPITOR) 20 mg tablet Take 1 tablet (20 mg total) by mouth daily 30 tablet 2    ergocalciferol (VITAMIN D2) 50,000 units Take 1 capsule (50,000 Units total) by mouth once a week 12 capsule 0    estradiol (Vivelle-Dot) 0 075 MG/24HR Place 1 patch on the skin once a week 8 patch 3    meloxicam (MOBIC) 15 mg tablet       XERESE 5-1 % CREA        No current facility-administered medications for this visit  No Known Allergies    Review of Systems   Constitutional: Negative for chills, fatigue and fever  HENT: Positive for rhinorrhea, sinus pressure and sinus pain  Negative for ear pain, facial swelling, hearing loss, sneezing, sore throat and trouble swallowing  Eyes: Negative for redness and visual disturbance  Respiratory: Negative for cough, shortness of breath and wheezing  Cardiovascular: Negative for chest pain, palpitations and leg swelling  Gastrointestinal: Negative for abdominal pain, diarrhea, nausea and vomiting  Genitourinary: Negative for dysuria  Neurological: Positive for headaches  Vitals:    04/12/21 1545   Weight: 80 3 kg (177 lb)   Height: 5' 2" (1 575 m)     Physical exam not conducted due to nature of visit  I spent 10 minutes directly with the patient during this visit    VIRTUAL VISIT DISCLAIMER    James Hudson acknowledges that she has consented to an online visit or consultation  She understands that the online visit is based solely on information provided by her, and that, in the absence of a face-to-face physical evaluation by the physician, the diagnosis she receives is both limited and provisional in terms of accuracy and completeness  This is not intended to replace a full medical face-to-face evaluation by the physician   James Hudson understands and accepts these terms

## 2021-04-17 ENCOUNTER — TRANSCRIBE ORDERS (OUTPATIENT)
Dept: LAB | Facility: CLINIC | Age: 61
End: 2021-04-17

## 2021-04-17 ENCOUNTER — APPOINTMENT (OUTPATIENT)
Dept: LAB | Facility: CLINIC | Age: 61
End: 2021-04-17
Payer: COMMERCIAL

## 2021-04-17 DIAGNOSIS — R79.89 ABNORMAL TSH: ICD-10-CM

## 2021-04-17 DIAGNOSIS — E78.00 HYPERCHOLESTEREMIA: ICD-10-CM

## 2021-04-17 DIAGNOSIS — E55.9 VITAMIN D DEFICIENCY: ICD-10-CM

## 2021-04-17 LAB
25(OH)D3 SERPL-MCNC: 40 NG/ML (ref 30–100)
CHOLEST SERPL-MCNC: 206 MG/DL (ref 50–200)
HDLC SERPL-MCNC: 56 MG/DL
LDLC SERPL CALC-MCNC: 130 MG/DL (ref 0–100)
NONHDLC SERPL-MCNC: 150 MG/DL
TRIGL SERPL-MCNC: 99 MG/DL
TSH SERPL DL<=0.05 MIU/L-ACNC: 3.39 UIU/ML (ref 0.36–3.74)

## 2021-04-17 PROCEDURE — 36415 COLL VENOUS BLD VENIPUNCTURE: CPT

## 2021-04-17 PROCEDURE — 80061 LIPID PANEL: CPT

## 2021-04-17 PROCEDURE — 84443 ASSAY THYROID STIM HORMONE: CPT

## 2021-04-17 PROCEDURE — 82306 VITAMIN D 25 HYDROXY: CPT

## 2021-04-22 DIAGNOSIS — E78.00 HYPERCHOLESTEREMIA: Primary | ICD-10-CM

## 2021-05-13 ENCOUNTER — HOSPITAL ENCOUNTER (OUTPATIENT)
Dept: RADIOLOGY | Age: 61
Discharge: HOME | End: 2021-05-13
Payer: COMMERCIAL

## 2021-05-13 VITALS — WEIGHT: 179 LBS | HEIGHT: 62 IN | BODY MASS INDEX: 32.94 KG/M2

## 2021-05-13 DIAGNOSIS — M81.0 OSTEOPOROSIS WITHOUT CURRENT PATHOLOGICAL FRACTURE, UNSPECIFIED OSTEOPOROSIS TYPE: ICD-10-CM

## 2021-05-13 DIAGNOSIS — Z12.31 ENCOUNTER FOR SCREENING MAMMOGRAM FOR MALIGNANT NEOPLASM OF BREAST: ICD-10-CM

## 2021-05-13 PROCEDURE — 77063 BREAST TOMOSYNTHESIS BI: CPT

## 2021-05-13 PROCEDURE — 77080 DXA BONE DENSITY AXIAL: CPT

## 2021-05-13 PROCEDURE — 77067 SCR MAMMO BI INCL CAD: CPT

## 2021-06-05 ENCOUNTER — APPOINTMENT (OUTPATIENT)
Dept: LAB | Facility: CLINIC | Age: 61
End: 2021-06-05
Payer: COMMERCIAL

## 2021-06-05 DIAGNOSIS — E78.00 HYPERCHOLESTEREMIA: ICD-10-CM

## 2021-06-05 LAB
CHOLEST SERPL-MCNC: 161 MG/DL (ref 50–200)
HDLC SERPL-MCNC: 55 MG/DL
LDLC SERPL CALC-MCNC: 84 MG/DL (ref 0–100)
NONHDLC SERPL-MCNC: 106 MG/DL
TRIGL SERPL-MCNC: 110 MG/DL

## 2021-06-05 PROCEDURE — 36415 COLL VENOUS BLD VENIPUNCTURE: CPT

## 2021-06-05 PROCEDURE — 80061 LIPID PANEL: CPT

## 2021-08-05 ENCOUNTER — VBI (OUTPATIENT)
Dept: ADMINISTRATIVE | Facility: OTHER | Age: 61
End: 2021-08-05

## 2021-10-07 ENCOUNTER — OFFICE VISIT (OUTPATIENT)
Dept: URGENT CARE | Facility: CLINIC | Age: 61
End: 2021-10-07
Payer: COMMERCIAL

## 2021-10-07 VITALS — TEMPERATURE: 100 F | RESPIRATION RATE: 15 BRPM | HEART RATE: 123 BPM | OXYGEN SATURATION: 99 %

## 2021-10-07 DIAGNOSIS — J02.0 STREP PHARYNGITIS: ICD-10-CM

## 2021-10-07 DIAGNOSIS — J06.9 ACUTE URI: Primary | ICD-10-CM

## 2021-10-07 LAB — S PYO AG THROAT QL: POSITIVE

## 2021-10-07 PROCEDURE — 0241U HB NFCT DS VIR RESP RNA 4 TRGT: CPT | Performed by: NURSE PRACTITIONER

## 2021-10-07 PROCEDURE — 87880 STREP A ASSAY W/OPTIC: CPT | Performed by: NURSE PRACTITIONER

## 2021-10-07 PROCEDURE — 99213 OFFICE O/P EST LOW 20 MIN: CPT | Performed by: NURSE PRACTITIONER

## 2021-10-07 RX ORDER — ALBUTEROL SULFATE 90 UG/1
2 AEROSOL, METERED RESPIRATORY (INHALATION) EVERY 6 HOURS PRN
Qty: 18 G | Refills: 0 | Status: SHIPPED | OUTPATIENT
Start: 2021-10-07

## 2021-10-07 RX ORDER — AMOXICILLIN 500 MG/1
500 CAPSULE ORAL EVERY 12 HOURS SCHEDULED
Qty: 20 CAPSULE | Refills: 0 | Status: SHIPPED | OUTPATIENT
Start: 2021-10-07 | End: 2021-10-17

## 2021-10-08 LAB
FLUAV RNA RESP QL NAA+PROBE: NEGATIVE
FLUBV RNA RESP QL NAA+PROBE: NEGATIVE
RSV RNA RESP QL NAA+PROBE: POSITIVE
SARS-COV-2 RNA RESP QL NAA+PROBE: NEGATIVE

## 2021-11-18 ENCOUNTER — OFFICE VISIT (OUTPATIENT)
Dept: URGENT CARE | Facility: CLINIC | Age: 61
End: 2021-11-18
Payer: COMMERCIAL

## 2021-11-18 VITALS — TEMPERATURE: 98.1 F | RESPIRATION RATE: 16 BRPM | OXYGEN SATURATION: 98 % | HEART RATE: 100 BPM

## 2021-11-18 DIAGNOSIS — B96.89 ACUTE BACTERIAL BRONCHITIS: Primary | ICD-10-CM

## 2021-11-18 DIAGNOSIS — J20.8 ACUTE BACTERIAL BRONCHITIS: Primary | ICD-10-CM

## 2021-11-18 PROCEDURE — 99213 OFFICE O/P EST LOW 20 MIN: CPT | Performed by: PHYSICIAN ASSISTANT

## 2021-11-18 RX ORDER — AMOXICILLIN AND CLAVULANATE POTASSIUM 875; 125 MG/1; MG/1
1 TABLET, FILM COATED ORAL EVERY 12 HOURS SCHEDULED
Qty: 20 TABLET | Refills: 0 | Status: SHIPPED | OUTPATIENT
Start: 2021-11-18 | End: 2021-11-28

## 2021-11-18 RX ORDER — BENZONATATE 200 MG/1
200 CAPSULE ORAL 3 TIMES DAILY PRN
Qty: 20 CAPSULE | Refills: 0 | Status: SHIPPED | OUTPATIENT
Start: 2021-11-18

## 2021-11-20 ENCOUNTER — OFFICE VISIT (OUTPATIENT)
Dept: URGENT CARE | Facility: CLINIC | Age: 61
End: 2021-11-20
Payer: COMMERCIAL

## 2021-11-20 VITALS — HEART RATE: 85 BPM | OXYGEN SATURATION: 98 % | TEMPERATURE: 98.7 F

## 2021-11-20 DIAGNOSIS — R05.9 COUGH: Primary | ICD-10-CM

## 2021-11-20 DIAGNOSIS — Z20.822 ENCOUNTER FOR SCREENING LABORATORY TESTING FOR COVID-19 VIRUS: ICD-10-CM

## 2021-11-20 PROCEDURE — U0005 INFEC AGEN DETEC AMPLI PROBE: HCPCS

## 2021-11-20 PROCEDURE — U0003 INFECTIOUS AGENT DETECTION BY NUCLEIC ACID (DNA OR RNA); SEVERE ACUTE RESPIRATORY SYNDROME CORONAVIRUS 2 (SARS-COV-2) (CORONAVIRUS DISEASE [COVID-19]), AMPLIFIED PROBE TECHNIQUE, MAKING USE OF HIGH THROUGHPUT TECHNOLOGIES AS DESCRIBED BY CMS-2020-01-R: HCPCS

## 2021-11-21 LAB — SARS-COV-2 RNA RESP QL NAA+PROBE: NEGATIVE

## 2022-01-20 ENCOUNTER — TELEMEDICINE (OUTPATIENT)
Dept: FAMILY MEDICINE CLINIC | Facility: CLINIC | Age: 62
End: 2022-01-20
Payer: COMMERCIAL

## 2022-01-20 ENCOUNTER — APPOINTMENT (OUTPATIENT)
Dept: RADIOLOGY | Facility: CLINIC | Age: 62
End: 2022-01-20
Payer: COMMERCIAL

## 2022-01-20 VITALS — HEIGHT: 62 IN | BODY MASS INDEX: 32.2 KG/M2 | WEIGHT: 175 LBS

## 2022-01-20 DIAGNOSIS — R05.3 PERSISTENT COUGH: ICD-10-CM

## 2022-01-20 DIAGNOSIS — E78.5 DYSLIPIDEMIA: Primary | ICD-10-CM

## 2022-01-20 DIAGNOSIS — E78.00 HYPERCHOLESTEREMIA: ICD-10-CM

## 2022-01-20 DIAGNOSIS — J01.10 SUBACUTE FRONTAL SINUSITIS: ICD-10-CM

## 2022-01-20 PROBLEM — J01.90 SUBACUTE SINUSITIS: Status: ACTIVE | Noted: 2022-01-20

## 2022-01-20 PROCEDURE — 3725F SCREEN DEPRESSION PERFORMED: CPT | Performed by: FAMILY MEDICINE

## 2022-01-20 PROCEDURE — 99213 OFFICE O/P EST LOW 20 MIN: CPT | Performed by: FAMILY MEDICINE

## 2022-01-20 PROCEDURE — 3008F BODY MASS INDEX DOCD: CPT | Performed by: FAMILY MEDICINE

## 2022-01-20 PROCEDURE — 71046 X-RAY EXAM CHEST 2 VIEWS: CPT

## 2022-01-20 RX ORDER — ATORVASTATIN CALCIUM 20 MG/1
20 TABLET, FILM COATED ORAL DAILY
Qty: 30 TABLET | Refills: 6 | Status: SHIPPED | OUTPATIENT
Start: 2022-01-20

## 2022-01-20 RX ORDER — AMOXICILLIN 875 MG/1
875 TABLET, COATED ORAL 2 TIMES DAILY
Qty: 20 TABLET | Refills: 0 | Status: SHIPPED | OUTPATIENT
Start: 2022-01-20 | End: 2022-01-30

## 2022-01-20 NOTE — PROGRESS NOTES
Virtual Regular Visit    Verification of patient location:    Patient is located in the following state in which I hold an active license [place]      Assessment/Plan:    Problem List Items Addressed This Visit        Respiratory    Subacute sinusitis     Amoxicillin 875 bid          Relevant Medications    amoxicillin (AMOXIL) 875 mg tablet       Other    Dyslipidemia - Primary     Pt not taking her meds "since it was good the last time" needs to restart         Relevant Orders    Lipid panel      Other Visit Diagnoses     Hypercholesteremia        Relevant Medications    atorvastatin (LIPITOR) 20 mg tablet    Persistent cough        Relevant Orders    XR chest pa & lateral               Reason for visit is  Headache and cough  Chief Complaint   Patient presents with    Headache     sinus pressure    Cough     had for about a month  had bronchitis in November  cough keeps coming back    Virtual Regular Visit        Encounter provider Ame Berman MD    Provider located at [address] [place] [id number] [phone]      Recent Visits  No visits were found meeting these conditions  Showing recent visits within past 7 days and meeting all other requirements  Today's Visits  Date Type Provider Dept   01/20/22 Telemedicine Ame Berman MD  [address] today's visits and meeting all other requirements  Future Appointments  No visits were found meeting these conditions  Showing future appointments within next 150 days and meeting all other requirements       The patient was identified by name and date of birth  James Hudson was informed that this is a telemedicine visit and that the visit is being conducted through  [address] and patient was informed this is a secure, HIPAA-complaint platform  She agrees to proceed     My office door was closed  No one else was in the room    She acknowledged consent and understanding of privacy and security of the video platform  The patient has agreed to participate and understands they can discontinue the visit at any time  Patient is aware this is a billable service  Subjective  James Jones is a 60 y o  female pt with headache and cough      pt had RSV  and then bronchitis and now with sinus headache has had cough  On off since   Nasal  Discharge and sputum in throat     HPI     Past Medical History:   Diagnosis Date    Abnormal ECG     Cancer ([address] )     hydocarcinoma    Chest pain     History of mammogram 2017       Past Surgical History:   Procedure Laterality Date     SECTION      CYST REMOVAL Right     RIGHT HAND    ECTOPIC PREGNANCY SURGERY      HAND SURGERY Right     HERNIA REPAIR      KNEE SURGERY      KNEE ARTHRODESIS    RIGHT    LYMPH NODE BIOPSY      OOPHORECTOMY      ROTATOR CUFF REPAIR      X2    TOTAL ABDOMINAL HYSTERECTOMY         Current Outpatient Medications   Medication Sig Dispense Refill    estradiol (Vivelle-Dot) 0 075 MG/24HR Place 1 patch on the skin once a week 8 patch 3    albuterol (Ventolin HFA) 90 mcg/act inhaler Inhale 2 puffs every 6 (six) hours as needed for wheezing or shortness of breath (Patient not taking: Reported on 2022 ) 18 g 0    amoxicillin (AMOXIL) 875 mg tablet Take 1 tablet (875 mg total) by mouth 2 (two) times a day for 10 days 20 tablet 0    atorvastatin (LIPITOR) 20 mg tablet Take 1 tablet (20 mg total) by mouth daily 30 tablet 6    benzonatate (TESSALON) 200 MG capsule Take 1 capsule (200 mg total) by mouth 3 (three) times a day as needed for cough (Patient not taking: Reported on 2022 ) 20 capsule 0    ergocalciferol (VITAMIN D2) 50,000 units Take 1 capsule (50,000 Units total) by mouth once a week (Patient not taking: Reported on 2022 ) 12 capsule 0    meloxicam (MOBIC) 15 mg tablet   (Patient not taking: Reported on 2021 )      XERESE 5-1 % CREA   (Patient not taking: Reported on 1/20/2022 )       No current facility-administered medications for this visit  No Known Allergies    Review of Systems   Constitutional: Negative for appetite change, chills, fatigue and fever  HENT: Positive for sinus pressure and sinus pain  Negative for congestion, rhinorrhea and sore throat  Eyes: Negative for discharge  Respiratory: Negative for cough, shortness of breath and wheezing  Cardiovascular: Negative for chest pain and palpitations  Gastrointestinal: Negative for abdominal pain, diarrhea, nausea and vomiting  Musculoskeletal: Negative for myalgias  Neurological: Positive for headaches  Psychiatric/Behavioral: Negative for dysphoric mood  The patient is not nervous/anxious  Video Exam    Vitals:    01/20/22 1041   Weight: 79 4 kg (175 lb)   Height: 5' 2" (1 575 m)       Physical Exam  Constitutional:       General: She is not in acute distress  Appearance: Normal appearance  She is well-developed  She is not ill-appearing  Eyes:      Extraocular Movements: Extraocular movements intact  Neck:      Thyroid: No thyromegaly  Cardiovascular:      Rate and Rhythm: Normal rate  Pulmonary:      Effort: Pulmonary effort is normal  No respiratory distress  Musculoskeletal:      Cervical back: Normal range of motion  Neurological:      General: No focal deficit present  Mental Status: She is alert and oriented to person, place, and time  Mental status is at baseline  Psychiatric:         Mood and Affect: Mood normal          Behavior: Behavior normal               VIRTUAL VISIT DISCLAIMER      James Hudson verbally agrees to participate in [employer]   Pt is aware that [employer] could be limited without vital signs or the ability to perform a full hands-on physical exam  James Hudson understands she or the provider may request at any time to terminate the video visit and request the patient to seek care or treatment in person

## 2022-01-24 PROBLEM — J18.9 PNEUMONIA DUE TO INFECTIOUS ORGANISM: Status: ACTIVE | Noted: 2022-01-24

## 2022-01-24 PROCEDURE — U0003 INFECTIOUS AGENT DETECTION BY NUCLEIC ACID (DNA OR RNA); SEVERE ACUTE RESPIRATORY SYNDROME CORONAVIRUS 2 (SARS-COV-2) (CORONAVIRUS DISEASE [COVID-19]), AMPLIFIED PROBE TECHNIQUE, MAKING USE OF HIGH THROUGHPUT TECHNOLOGIES AS DESCRIBED BY CMS-2020-01-R: HCPCS | Performed by: FAMILY MEDICINE

## 2022-01-24 PROCEDURE — U0005 INFEC AGEN DETEC AMPLI PROBE: HCPCS | Performed by: FAMILY MEDICINE

## 2022-02-02 ENCOUNTER — APPOINTMENT (OUTPATIENT)
Dept: RADIOLOGY | Facility: CLINIC | Age: 62
End: 2022-02-02
Payer: COMMERCIAL

## 2022-02-02 DIAGNOSIS — J18.9 PNEUMONIA OF RIGHT LOWER LOBE DUE TO INFECTIOUS ORGANISM: ICD-10-CM

## 2022-02-02 PROCEDURE — 71046 X-RAY EXAM CHEST 2 VIEWS: CPT

## 2022-02-08 ENCOUNTER — TELEPHONE (OUTPATIENT)
Dept: FAMILY MEDICINE CLINIC | Facility: CLINIC | Age: 62
End: 2022-02-08

## 2022-04-23 ENCOUNTER — APPOINTMENT (OUTPATIENT)
Dept: LAB | Facility: CLINIC | Age: 62
End: 2022-04-23
Payer: COMMERCIAL

## 2022-04-23 DIAGNOSIS — E78.5 DYSLIPIDEMIA: ICD-10-CM

## 2022-04-23 LAB
CHOLEST SERPL-MCNC: 220 MG/DL
HDLC SERPL-MCNC: 59 MG/DL
LDLC SERPL CALC-MCNC: 138 MG/DL (ref 0–100)
NONHDLC SERPL-MCNC: 161 MG/DL
TRIGL SERPL-MCNC: 115 MG/DL

## 2022-04-23 PROCEDURE — 36415 COLL VENOUS BLD VENIPUNCTURE: CPT

## 2022-04-23 PROCEDURE — 80061 LIPID PANEL: CPT

## 2022-04-26 DIAGNOSIS — E78.5 DYSLIPIDEMIA: Primary | ICD-10-CM

## 2022-05-16 ENCOUNTER — HOSPITAL ENCOUNTER (OUTPATIENT)
Dept: RADIOLOGY | Age: 62
Discharge: HOME | End: 2022-05-16
Payer: COMMERCIAL

## 2022-05-16 VITALS — HEIGHT: 62 IN | WEIGHT: 178 LBS | BODY MASS INDEX: 32.76 KG/M2

## 2022-05-16 DIAGNOSIS — Z12.31 ENCOUNTER FOR SCREENING MAMMOGRAM FOR MALIGNANT NEOPLASM OF BREAST: ICD-10-CM

## 2022-05-16 PROCEDURE — 77063 BREAST TOMOSYNTHESIS BI: CPT

## 2022-05-16 PROCEDURE — 77067 SCR MAMMO BI INCL CAD: CPT

## 2022-06-06 DIAGNOSIS — N95.1 MENOPAUSAL SYMPTOMS: ICD-10-CM

## 2022-06-07 RX ORDER — ESTRADIOL 0.07 MG/D
1 FILM, EXTENDED RELEASE TRANSDERMAL WEEKLY
Qty: 8 PATCH | Refills: 1 | Status: SHIPPED | OUTPATIENT
Start: 2022-06-07

## 2022-10-12 PROBLEM — Z01.419 GYNECOLOGIC EXAM NORMAL: Status: RESOLVED | Noted: 2021-01-18 | Resolved: 2022-10-12

## 2022-10-12 PROBLEM — J01.90 SUBACUTE SINUSITIS: Status: RESOLVED | Noted: 2022-01-20 | Resolved: 2022-10-12

## 2022-10-12 PROBLEM — J18.9 PNEUMONIA DUE TO INFECTIOUS ORGANISM: Status: RESOLVED | Noted: 2022-01-24 | Resolved: 2022-10-12

## 2023-01-12 DIAGNOSIS — N95.1 MENOPAUSAL SYMPTOMS: ICD-10-CM

## 2023-01-13 RX ORDER — ESTRADIOL 0.07 MG/D
1 FILM, EXTENDED RELEASE TRANSDERMAL WEEKLY
Qty: 8 PATCH | Refills: 1 | Status: SHIPPED | OUTPATIENT
Start: 2023-01-13

## 2023-04-17 PROBLEM — Z01.419 ENCOUNTER FOR GYNECOLOGICAL EXAMINATION (GENERAL) (ROUTINE) WITHOUT ABNORMAL FINDINGS: Status: RESOLVED | Noted: 2019-03-28 | Resolved: 2023-04-17

## 2023-04-26 ENCOUNTER — PREP FOR PROCEDURE (OUTPATIENT)
Dept: GASTROENTEROLOGY | Facility: CLINIC | Age: 63
End: 2023-04-26

## 2023-04-26 ENCOUNTER — TELEPHONE (OUTPATIENT)
Dept: GASTROENTEROLOGY | Facility: CLINIC | Age: 63
End: 2023-04-26

## 2023-04-26 DIAGNOSIS — Z12.11 SCREENING FOR COLON CANCER: Primary | ICD-10-CM

## 2023-04-26 NOTE — TELEPHONE ENCOUNTER
"[facility] Assessment    Name: Reno Gutierrez  YOB: 1960  Last Height: 5' 2\" (1 575 m)  Last weight: 82 6 kg (182 lb)  BMI: 33 29 kg/m²  Diagnosis:   Date of procedure: 7/10/2023  Prep:   Responsible : Tamera Valadez ()  Phone#: [phone]  Name completing form: Jez Kang  Date form completed: 04/26/23      If the patient answers yes to any of these questions, schedule in a hospital  Are you pregnant: No  Do you rely on a wheelchair for mobility: No  Have you been diagnosed with End Stage Renal Disease (ESRD): No  Do you need oxygen during the day: No  Have you had a heart attack or stroke within the past three months: No  Have you had a seizure within the past three months: No  Have you ever been informed by anesthesia that you have a difficult airway: No  Additional Questions  Have you had any cardiac testing or are under the care of a Cardiologist (see cardiac list): No  Cardiac list:   Do you have an implanted cardiac defibrillator: No (Comment:  This patient should be scheduled in the hospital)    Have any bleeding problems, such as anemia or hemophilia (If patient has H&H result below 8, schedule in hospital   H&H must be within 30 days of procedure): No    Had an organ transplant within the past 3 months: No    Do you have any present infections: No  Do you get short of breath when walking a few blocks: No  Have you been diagnosed with diabetes: No  Comments (provide cardiac provider information if applicable):        "

## 2023-04-26 NOTE — TELEPHONE ENCOUNTER
04/26/23  Screened by: Ashish Varela    Referring Provider     Pre- Screening: There is no height or weight on file to calculate BMI  Has patient been referred for a routine screening Colonoscopy? yes  Is the patient between 39-70 years old? yes      Previous Colonoscopy yes   If yes:    Date: 5 years ago    Facility:     Reason:       SCHEDULING STAFF: If the patient is between 39yrs-47yrs, please advise patient to confirm benefits/coverage with their insurance company for a routine screening colonoscopy, some insurance carriers will only cover at [address] or older  If the patient is over 66years old, please schedule an office visit  Does the patient want to see a Gastroenterologist prior to their procedure OR are they having any GI symptoms? no    Has the patient been hospitalized or had abdominal surgery in the past 6 months? no    Does the patient use supplemental oxygen? no    Does the patient take Coumadin, Lovenox, Plavix, Elliquis, Xarelto, or other blood thinning medication? no    Has the patient had a stroke, cardiac event, or stent placed in the past year? no    SCHEDULING STAFF: If patient answers NO to above questions, then schedule procedure  If patient answers YES to above questions, then schedule office appointment  If patient is between 45yrs - 49yrs, please advise patient that we will have to confirm benefits & coverage with their insurance company for a routine screening colonoscopy        PT PASSED OA

## 2023-04-26 NOTE — TELEPHONE ENCOUNTER
Scheduled date of colonoscopy (as of today): 7/10/2023  Physician performing colonoscopy: Dr Lea Nunez  Location of colonoscopy: [facility]  Clearances: N/A

## 2023-05-02 ENCOUNTER — TELEPHONE (OUTPATIENT)
Dept: FAMILY MEDICINE CLINIC | Facility: CLINIC | Age: 63
End: 2023-05-02

## 2023-06-06 ENCOUNTER — RA CDI HCC (OUTPATIENT)
Dept: OTHER | Facility: HOSPITAL | Age: 63
End: 2023-06-06

## 2023-06-06 NOTE — PROGRESS NOTES
[facility]  coding opportunities       Chart reviewed, no opportunity found: CHART REVIEWED, NO OPPORTUNITY FOUND        Patients Insurance        Commercial Insurance: [address] [place] [address]

## 2023-06-13 ENCOUNTER — OFFICE VISIT (OUTPATIENT)
Dept: FAMILY MEDICINE CLINIC | Facility: CLINIC | Age: 63
End: 2023-06-13
Payer: COMMERCIAL

## 2023-06-13 VITALS
OXYGEN SATURATION: 97 % | SYSTOLIC BLOOD PRESSURE: 120 MMHG | DIASTOLIC BLOOD PRESSURE: 70 MMHG | RESPIRATION RATE: 16 BRPM | HEART RATE: 84 BPM | HEIGHT: 62 IN | TEMPERATURE: 97.9 F | BODY MASS INDEX: 34.04 KG/M2 | WEIGHT: 185 LBS

## 2023-06-13 DIAGNOSIS — N95.1 MENOPAUSAL SYMPTOMS: ICD-10-CM

## 2023-06-13 DIAGNOSIS — Z00.00 ANNUAL PHYSICAL EXAM: Primary | ICD-10-CM

## 2023-06-13 DIAGNOSIS — M17.0 ARTHRITIS OF BOTH KNEES: ICD-10-CM

## 2023-06-13 DIAGNOSIS — R53.83 OTHER FATIGUE: ICD-10-CM

## 2023-06-13 DIAGNOSIS — M85.89 OSTEOPENIA OF MULTIPLE SITES: ICD-10-CM

## 2023-06-13 DIAGNOSIS — E78.5 DYSLIPIDEMIA: ICD-10-CM

## 2023-06-13 DIAGNOSIS — Z11.59 NEED FOR HEPATITIS C SCREENING TEST: ICD-10-CM

## 2023-06-13 DIAGNOSIS — E66.09 CLASS 1 OBESITY DUE TO EXCESS CALORIES WITH SERIOUS COMORBIDITY AND BODY MASS INDEX (BMI) OF 33.0 TO 33.9 IN ADULT: ICD-10-CM

## 2023-06-13 PROBLEM — R07.9 CHEST PAIN SYNDROME: Status: RESOLVED | Noted: 2017-05-22 | Resolved: 2023-06-13

## 2023-06-13 PROBLEM — J01.80 OTHER ACUTE SINUSITIS: Status: RESOLVED | Noted: 2020-11-19 | Resolved: 2023-06-13

## 2023-06-13 PROBLEM — R06.09 DYSPNEA ON EXERTION: Status: RESOLVED | Noted: 2020-06-11 | Resolved: 2023-06-13

## 2023-06-13 PROBLEM — E66.811 CLASS 1 OBESITY DUE TO EXCESS CALORIES WITH SERIOUS COMORBIDITY AND BODY MASS INDEX (BMI) OF 33.0 TO 33.9 IN ADULT: Status: ACTIVE | Noted: 2023-06-13

## 2023-06-13 PROCEDURE — 99396 PREV VISIT EST AGE 40-64: CPT | Performed by: FAMILY MEDICINE

## 2023-06-13 PROCEDURE — 99214 OFFICE O/P EST MOD 30 MIN: CPT | Performed by: FAMILY MEDICINE

## 2023-06-13 NOTE — PROGRESS NOTES
Name: Kelvin Harris      : 1960      MRN: [id number]  Encounter Provider: Butch Garcia MD  Encounter Date: 2023   Encounter department: [address] [place] [address] MEDICINE    Assessment & Plan     1  Annual physical exam  Assessment & Plan:  Check routine labs      Orders:  -     CBC and differential; Future  -     Comprehensive metabolic panel; Future; Expected date: 2023  -     Lipid panel; Future; Expected date: 2023  -     Urinalysis with microscopic    2  Osteopenia of multiple sites  Assessment & Plan:  Due for dexa vit d level increase calcium rich foods exercise      Orders:  -     DXA bone density spine hip and pelvis; Future; Expected date: 2023  -     Vitamin D 25 hydroxy; Future    3  Need for hepatitis C screening test  -     Hepatitis C Antibody; Future    4  Dyslipidemia  Assessment & Plan:  Check labs  Pt is not taking her lipitor       5  Class 1 obesity due to excess calories with serious comorbidity and body mass index (BMI) of 33 0 to 33 9 in adult  Assessment & Plan:  Discussion on diet and exercise guidelines for weight loss and  health reviewed with pt         6  Menopausal symptoms  Assessment & Plan:  Pt on vivelle  mammo scheduled      7  Arthritis of both knees  Assessment & Plan:  Significant pain pt seeing ortho had injections   Recommend follow up with ortho       8  Other fatigue  Assessment & Plan:  Avoid sweets starches check labs   TSH    Orders:  -     TSH, 3rd generation; Future           Subjective      HPI Patient here for annual physical    Review of Systems   Constitutional: Positive for fatigue  Negative for appetite change, chills and fever  Respiratory: Negative for cough, chest tightness and shortness of breath  Cardiovascular: Negative for chest pain, palpitations and leg swelling  Gastrointestinal: Negative for abdominal pain, constipation, diarrhea, nausea and vomiting     Genitourinary: Negative for difficulty urinating and "frequency  Musculoskeletal: Positive for arthralgias (knees)  Negative for back pain, gait problem and neck pain  Skin: Negative for rash  Neurological: Negative for dizziness, weakness, light-headedness, numbness and headaches  Hematological: Does not bruise/bleed easily  Psychiatric/Behavioral: Negative for dysphoric mood and sleep disturbance  The patient is not nervous/anxious  Current Outpatient Medications on File Prior to Visit   Medication Sig   • estradiol (Vivelle-Dot) 0 075 MG/24HR Place 1 patch on the skin once a week   • XERESE 5-1 % CREA    • atorvastatin (LIPITOR) 20 mg tablet Take 1 tablet (20 mg total) by mouth daily (Patient not taking: Reported on 4/17/2023)   • ergocalciferol (VITAMIN D2) 50,000 units Take 1 capsule (50,000 Units total) by mouth once a week (Patient not taking: Reported on 1/20/2022)       Objective     /70 (BP Location: Left arm, Patient Position: Sitting, Cuff Size: Standard)   Pulse 84   Temp 97 9 °F (36 6 °C) (Tympanic)   Resp 16   Ht 5' 2\" (1 575 m)   Wt 83 9 kg (185 lb)   SpO2 97%   BMI 33 84 kg/m²     Physical Exam  Vitals reviewed  Constitutional:       General: She is not in acute distress  Appearance: Normal appearance  She is well-developed  She is obese  She is not ill-appearing  HENT:      Head: Normocephalic  Right Ear: Tympanic membrane, ear canal and external ear normal       Left Ear: Tympanic membrane, ear canal and external ear normal       Nose: Nose normal       Mouth/Throat:      Mouth: Mucous membranes are moist       Pharynx: No oropharyngeal exudate  Eyes:      General: Lids are normal  No scleral icterus  Extraocular Movements: Extraocular movements intact  Conjunctiva/sclera: Conjunctivae normal       Pupils: Pupils are equal, round, and reactive to light  Neck:      Thyroid: No thyromegaly  Vascular: No carotid bruit  Cardiovascular:      Rate and Rhythm: Normal rate and regular rhythm       " Pulses: Normal pulses  Heart sounds: Normal heart sounds  No murmur heard  No friction rub  Pulmonary:      Effort: Pulmonary effort is normal       Breath sounds: Normal breath sounds  No wheezing, rhonchi or rales  Abdominal:      General: Bowel sounds are normal  There is no distension  Palpations: Abdomen is soft  There is no mass  Tenderness: There is no abdominal tenderness  There is no guarding  Hernia: No hernia is present  Musculoskeletal:         General: Normal range of motion  Cervical back: Normal range of motion and neck supple  Lymphadenopathy:      Cervical: No cervical adenopathy  Skin:     General: Skin is warm and dry  Findings: No rash  Comments: No abnormal appearing moles   Neurological:      General: No focal deficit present  Mental Status: She is alert and oriented to person, place, and time  Mental status is at baseline  Cranial Nerves: No cranial nerve deficit  Sensory: No sensory deficit  Motor: No weakness, tremor or abnormal muscle tone        Coordination: Coordination normal       Gait: Gait normal       Deep Tendon Reflexes: Reflexes normal    Psychiatric:         Mood and Affect: Mood normal          Speech: Speech normal          Behavior: Behavior normal                                                                                                                   Rory English MD

## 2023-06-20 ENCOUNTER — APPOINTMENT (OUTPATIENT)
Dept: LAB | Facility: MEDICAL CENTER | Age: 63
End: 2023-06-20
Payer: COMMERCIAL

## 2023-06-20 DIAGNOSIS — R82.998 URINE WBC INCREASED: Primary | ICD-10-CM

## 2023-06-20 DIAGNOSIS — Z00.00 ANNUAL PHYSICAL EXAM: ICD-10-CM

## 2023-06-20 DIAGNOSIS — M85.89 OSTEOPENIA OF MULTIPLE SITES: ICD-10-CM

## 2023-06-20 DIAGNOSIS — R53.83 OTHER FATIGUE: ICD-10-CM

## 2023-06-20 DIAGNOSIS — Z11.59 NEED FOR HEPATITIS C SCREENING TEST: ICD-10-CM

## 2023-06-20 DIAGNOSIS — E78.5 DYSLIPIDEMIA: ICD-10-CM

## 2023-06-20 LAB
25(OH)D3 SERPL-MCNC: 15.7 NG/ML (ref 30–100)
ALBUMIN SERPL BCP-MCNC: 3.8 G/DL (ref 3.5–5)
ALP SERPL-CCNC: 55 U/L (ref 46–116)
ALT SERPL W P-5'-P-CCNC: 25 U/L (ref 12–78)
ANION GAP SERPL CALCULATED.3IONS-SCNC: 3 MMOL/L
AST SERPL W P-5'-P-CCNC: 15 U/L (ref 5–45)
BACTERIA UR QL AUTO: ABNORMAL /HPF
BASOPHILS # BLD AUTO: 0.02 THOUSANDS/ÂΜL (ref 0–0.1)
BASOPHILS NFR BLD AUTO: 1 % (ref 0–1)
BILIRUB SERPL-MCNC: 0.33 MG/DL (ref 0.2–1)
BILIRUB UR QL STRIP: NEGATIVE
BUN SERPL-MCNC: 16 MG/DL (ref 5–25)
CALCIUM SERPL-MCNC: 8.9 MG/DL (ref 8.3–10.1)
CHLORIDE SERPL-SCNC: 108 MMOL/L (ref 96–108)
CHOLEST SERPL-MCNC: 224 MG/DL
CLARITY UR: CLEAR
CO2 SERPL-SCNC: 28 MMOL/L (ref 21–32)
COLOR UR: ABNORMAL
CREAT SERPL-MCNC: 0.56 MG/DL (ref 0.6–1.3)
EOSINOPHIL # BLD AUTO: 0.1 THOUSAND/ÂΜL (ref 0–0.61)
EOSINOPHIL NFR BLD AUTO: 3 % (ref 0–6)
ERYTHROCYTE [DISTWIDTH] IN BLOOD BY AUTOMATED COUNT: 15.2 % (ref 11.6–15.1)
GFR SERPL CREATININE-BSD FRML MDRD: 98 ML/MIN/1.73SQ M
GLUCOSE P FAST SERPL-MCNC: 84 MG/DL (ref 65–99)
GLUCOSE UR STRIP-MCNC: NEGATIVE MG/DL
HCT VFR BLD AUTO: 41.9 % (ref 34.8–46.1)
HCV AB SER QL: NORMAL
HDLC SERPL-MCNC: 52 MG/DL
HGB BLD-MCNC: 13.1 G/DL (ref 11.5–15.4)
HGB UR QL STRIP.AUTO: NEGATIVE
IMM GRANULOCYTES # BLD AUTO: 0 THOUSAND/UL (ref 0–0.2)
IMM GRANULOCYTES NFR BLD AUTO: 0 % (ref 0–2)
KETONES UR STRIP-MCNC: NEGATIVE MG/DL
LDLC SERPL CALC-MCNC: 146 MG/DL (ref 0–100)
LEUKOCYTE ESTERASE UR QL STRIP: NEGATIVE
LYMPHOCYTES # BLD AUTO: 1.21 THOUSANDS/ÂΜL (ref 0.6–4.47)
LYMPHOCYTES NFR BLD AUTO: 33 % (ref 14–44)
MCH RBC QN AUTO: 25.6 PG (ref 26.8–34.3)
MCHC RBC AUTO-ENTMCNC: 31.3 G/DL (ref 31.4–37.4)
MCV RBC AUTO: 82 FL (ref 82–98)
MONOCYTES # BLD AUTO: 0.38 THOUSAND/ÂΜL (ref 0.17–1.22)
MONOCYTES NFR BLD AUTO: 10 % (ref 4–12)
MUCOUS THREADS UR QL AUTO: ABNORMAL
NEUTROPHILS # BLD AUTO: 2.01 THOUSANDS/ÂΜL (ref 1.85–7.62)
NEUTS SEG NFR BLD AUTO: 53 % (ref 43–75)
NITRITE UR QL STRIP: NEGATIVE
NON-SQ EPI CELLS URNS QL MICRO: ABNORMAL /HPF
NONHDLC SERPL-MCNC: 172 MG/DL
NRBC BLD AUTO-RTO: 0 /100 WBCS
PH UR STRIP.AUTO: 6.5 [PH]
PLATELET # BLD AUTO: 208 THOUSANDS/UL (ref 149–390)
PMV BLD AUTO: 12 FL (ref 8.9–12.7)
POTASSIUM SERPL-SCNC: 4.1 MMOL/L (ref 3.5–5.3)
PROT SERPL-MCNC: 7.1 G/DL (ref 6.4–8.4)
PROT UR STRIP-MCNC: ABNORMAL MG/DL
RBC # BLD AUTO: 5.11 MILLION/UL (ref 3.81–5.12)
RBC #/AREA URNS AUTO: ABNORMAL /HPF
SODIUM SERPL-SCNC: 139 MMOL/L (ref 135–147)
SP GR UR STRIP.AUTO: 1.02 (ref 1–1.03)
TRIGL SERPL-MCNC: 128 MG/DL
TSH SERPL DL<=0.05 MIU/L-ACNC: 3.68 UIU/ML (ref 0.45–4.5)
UROBILINOGEN UR STRIP-ACNC: <2 MG/DL
WBC # BLD AUTO: 3.72 THOUSAND/UL (ref 4.31–10.16)
WBC #/AREA URNS AUTO: ABNORMAL /HPF

## 2023-06-20 PROCEDURE — 36415 COLL VENOUS BLD VENIPUNCTURE: CPT

## 2023-06-20 PROCEDURE — 80061 LIPID PANEL: CPT

## 2023-06-20 PROCEDURE — 86803 HEPATITIS C AB TEST: CPT

## 2023-06-20 PROCEDURE — 81001 URINALYSIS AUTO W/SCOPE: CPT | Performed by: FAMILY MEDICINE

## 2023-06-20 PROCEDURE — 85025 COMPLETE CBC W/AUTO DIFF WBC: CPT

## 2023-06-20 PROCEDURE — 80053 COMPREHEN METABOLIC PANEL: CPT

## 2023-06-20 PROCEDURE — 82306 VITAMIN D 25 HYDROXY: CPT

## 2023-06-20 PROCEDURE — 84443 ASSAY THYROID STIM HORMONE: CPT

## 2023-06-21 ENCOUNTER — APPOINTMENT (OUTPATIENT)
Dept: LAB | Facility: MEDICAL CENTER | Age: 63
End: 2023-06-21
Payer: COMMERCIAL

## 2023-06-21 LAB
BACTERIA UR QL AUTO: ABNORMAL /HPF
BILIRUB UR QL STRIP: NEGATIVE
CLARITY UR: CLEAR
COLOR UR: ABNORMAL
GLUCOSE UR STRIP-MCNC: NEGATIVE MG/DL
HGB UR QL STRIP.AUTO: NEGATIVE
KETONES UR STRIP-MCNC: NEGATIVE MG/DL
LEUKOCYTE ESTERASE UR QL STRIP: NEGATIVE
MUCOUS THREADS UR QL AUTO: ABNORMAL
NITRITE UR QL STRIP: NEGATIVE
NON-SQ EPI CELLS URNS QL MICRO: ABNORMAL /HPF
PH UR STRIP.AUTO: 6.5 [PH]
PROT UR STRIP-MCNC: ABNORMAL MG/DL
RBC #/AREA URNS AUTO: ABNORMAL /HPF
SP GR UR STRIP.AUTO: 1.02 (ref 1–1.03)
UROBILINOGEN UR STRIP-ACNC: <2 MG/DL
WBC #/AREA URNS AUTO: ABNORMAL /HPF

## 2023-06-21 PROCEDURE — 81001 URINALYSIS AUTO W/SCOPE: CPT

## 2023-06-26 DIAGNOSIS — Z12.12 ENCOUNTER FOR COLORECTAL CANCER SCREENING: Primary | ICD-10-CM

## 2023-06-26 DIAGNOSIS — Z12.11 ENCOUNTER FOR COLORECTAL CANCER SCREENING: Primary | ICD-10-CM

## 2023-06-29 ENCOUNTER — TELEPHONE (OUTPATIENT)
Dept: FAMILY MEDICINE CLINIC | Facility: CLINIC | Age: 63
End: 2023-06-29

## 2023-06-29 NOTE — TELEPHONE ENCOUNTER
UA is nl ( the red is not clinically significant )mucous threads are common and account for slight increase in protein

## 2023-07-10 ENCOUNTER — ANESTHESIA EVENT (OUTPATIENT)
Dept: GASTROENTEROLOGY | Facility: AMBULATORY SURGERY CENTER | Age: 63
End: 2023-07-10

## 2023-07-10 ENCOUNTER — HOSPITAL ENCOUNTER (OUTPATIENT)
Dept: GASTROENTEROLOGY | Facility: AMBULATORY SURGERY CENTER | Age: 63
Discharge: HOME | End: 2023-07-10
Payer: COMMERCIAL

## 2023-07-10 ENCOUNTER — ANESTHESIA (OUTPATIENT)
Dept: GASTROENTEROLOGY | Facility: AMBULATORY SURGERY CENTER | Age: 63
End: 2023-07-10

## 2023-07-10 VITALS
TEMPERATURE: 96.8 F | HEART RATE: 77 BPM | RESPIRATION RATE: 18 BRPM | WEIGHT: 184 LBS | SYSTOLIC BLOOD PRESSURE: 134 MMHG | DIASTOLIC BLOOD PRESSURE: 75 MMHG | OXYGEN SATURATION: 98 % | HEIGHT: 62 IN | BODY MASS INDEX: 33.86 KG/M2

## 2023-07-10 DIAGNOSIS — Z12.11 SCREENING FOR COLON CANCER: ICD-10-CM

## 2023-07-10 PROCEDURE — 45378 DIAGNOSTIC COLONOSCOPY: CPT | Performed by: INTERNAL MEDICINE

## 2023-07-10 RX ORDER — SODIUM CHLORIDE, SODIUM LACTATE, POTASSIUM CHLORIDE, CALCIUM CHLORIDE 600; 310; 30; 20 MG/100ML; MG/100ML; MG/100ML; MG/100ML
125 INJECTION, SOLUTION INTRAVENOUS CONTINUOUS
Status: DISCONTINUED | OUTPATIENT
Start: 2023-07-10 | End: 2023-07-14 | Stop reason: HOSPADM

## 2023-07-10 RX ORDER — PROPOFOL 10 MG/ML
INJECTION, EMULSION INTRAVENOUS AS NEEDED
Status: DISCONTINUED | OUTPATIENT
Start: 2023-07-10 | End: 2023-07-10

## 2023-07-10 RX ORDER — SODIUM CHLORIDE, SODIUM LACTATE, POTASSIUM CHLORIDE, CALCIUM CHLORIDE 600; 310; 30; 20 MG/100ML; MG/100ML; MG/100ML; MG/100ML
20 INJECTION, SOLUTION INTRAVENOUS CONTINUOUS
Status: DISCONTINUED | OUTPATIENT
Start: 2023-07-10 | End: 2023-07-14 | Stop reason: HOSPADM

## 2023-07-10 RX ORDER — SODIUM CHLORIDE, SODIUM LACTATE, POTASSIUM CHLORIDE, CALCIUM CHLORIDE 600; 310; 30; 20 MG/100ML; MG/100ML; MG/100ML; MG/100ML
INJECTION, SOLUTION INTRAVENOUS CONTINUOUS PRN
Status: DISCONTINUED | OUTPATIENT
Start: 2023-07-10 | End: 2023-07-10

## 2023-07-10 RX ADMIN — PROPOFOL 30 MG: 10 INJECTION, EMULSION INTRAVENOUS at 09:27

## 2023-07-10 RX ADMIN — PROPOFOL 20 MG: 10 INJECTION, EMULSION INTRAVENOUS at 09:26

## 2023-07-10 RX ADMIN — PROPOFOL 30 MG: 10 INJECTION, EMULSION INTRAVENOUS at 09:33

## 2023-07-10 RX ADMIN — PROPOFOL 20 MG: 10 INJECTION, EMULSION INTRAVENOUS at 09:30

## 2023-07-10 RX ADMIN — PROPOFOL 100 MG: 10 INJECTION, EMULSION INTRAVENOUS at 09:23

## 2023-07-10 RX ADMIN — SODIUM CHLORIDE, SODIUM LACTATE, POTASSIUM CHLORIDE, CALCIUM CHLORIDE: 600; 310; 30; 20 INJECTION, SOLUTION INTRAVENOUS at 09:19

## 2023-07-10 NOTE — ANESTHESIA PREPROCEDURE EVALUATION
Procedure:  COLONOSCOPY    Relevant Problems   ANESTHESIA (within normal limits)      CARDIO (within normal limits)  Stress test negative, normnal LV      MUSCULOSKELETAL   (+) Arthritis of both knees      PULMONARY   (+) Sleep apnea      Other   (+) Class 1 obesity due to excess calories with serious comorbidity and body mass index (BMI) of 33.0 to 33.9 in adult   (+) Dyslipidemia        Physical Exam    Airway    Mallampati score: III  TM Distance: >3 FB  Neck ROM: full     Dental   No notable dental hx     Cardiovascular      Pulmonary      Other Findings        Anesthesia Plan  ASA Score- 2     Anesthesia Type- IV sedation with anesthesia with ASA Monitors. Additional Monitors:   Airway Plan:           Plan Factors-Exercise tolerance (METS): >4 METS. Chart reviewed. Existing labs reviewed. Patient summary reviewed. Patient is not a current smoker. Induction-     Postoperative Plan-     Informed Consent- Anesthetic plan and risks discussed with patient. I personally reviewed this patient with the CRNA. Discussed and agreed on the Anesthesia Plan with the CRNA. Vicente Ferrell

## 2023-07-10 NOTE — H&P
History and Physical - [facility]  Julian Akhtar 58 y.o. female MRN: [id number]                  HPI: Julian Akhtar is a 58y.o. year old female who presents for colonoscopy. Last colonoscopy 6 years ago. Rectal bleeding. REVIEW OF SYSTEMS: Per the HPI, and otherwise unremarkable.     Historical Information   Past Medical History:   Diagnosis Date   • Abnormal ECG    • Arthritis    • Cancer (HCC)     hydocarcinoma- right hand   • Chest pain    • Colon polyp    • History of mammogram 2017   • Hyperlipidemia    • Osteopenia    • Sleep apnea     dental device     Past Surgical History:   Procedure Laterality Date   •  SECTION     • COLONOSCOPY     • CYST REMOVAL Right     RIGHT HAND   • ECTOPIC PREGNANCY SURGERY     • HAND SURGERY Right    • HERNIA REPAIR     • KNEE SURGERY      KNEE ARTHRODESIS    RIGHT   • LYMPH NODE BIOPSY     • OOPHORECTOMY     • ROTATOR CUFF REPAIR      X2   • TOTAL ABDOMINAL HYSTERECTOMY       Social History   Social History     Substance and Sexual Activity   Alcohol Use Yes    Comment: SOCIAL     Social History     Substance and Sexual Activity   Drug Use No     Social History     Tobacco Use   Smoking Status Former   • Packs/day: 0.50   • Years: 10.00   • Total pack years: 5.00   • Types: Cigarettes   • Quit date: 12   • Years since quittin.5   Smokeless Tobacco Never     Family History   Problem Relation Age of Onset   • Cancer Mother         bladder   • Heart disease Father         CARDIAC DISORDER   • Cervical cancer Family    • No Known Problems Sister    • No Known Problems Daughter    • No Known Problems Maternal Grandmother    • No Known Problems Maternal Grandfather    • No Known Problems Paternal Grandmother    • No Known Problems Paternal Grandfather    • No Known Problems Daughter    • No Known Problems Paternal Aunt    • No Known Problems Paternal Aunt    • No Known Problems Maternal Aunt    • No Known Problems Maternal Aunt Meds/Allergies       Current Outpatient Medications:   •  atorvastatin (LIPITOR) 20 mg tablet  •  Calcium Carb-Cholecalciferol (CALCIUM CARBONATE-VITAMIN D3 PO)  •  estradiol (Vivelle-Dot) 0.075 MG/24HR  •  XERESE 5-1 % CREA    Current Facility-Administered Medications:   •  lactated ringers infusion, 125 mL/hr, Intravenous, Continuous    Facility-Administered Medications Ordered in Other Encounters:   •  lactated ringers infusion, , Intravenous, Continuous PRN, New Bag at 07/10/23 0919    No Known Allergies    Objective     /77   Pulse 78   Temp (!) 96.8 °F (36 °C) (Temporal)   Resp 18   Ht 5' 2" (1.575 m)   Wt 83.5 kg (184 lb)   SpO2 96%   BMI 33.65 kg/m²       PHYSICAL EXAM    Gen: NAD  Head: NCAT  CV: RRR  CHEST: Clear  ABD: soft, NT/ND  EXT: no edema      ASSESSMENT/PLAN:  This is a 58y.o. year old female here for colonoscopy, and she is stable and optimized for her procedure.

## 2023-07-10 NOTE — DISCHARGE SUMMARY
Discharge Summary - Anthony Nurse 58 y.o. female MRN: [id number]    Unit/Bed#:  Encounter: [id number]    Admission Date: 7/10/2023    Admitting Diagnosis: Screening for colon cancer [Z12.11]    HPI: History of rectal bleeding. Screening colonoscopy    Procedures Performed: No orders of the defined types were placed in this encounter. Summary of Hospital Course: Colonoscopy was performed and patient tolerated procedure well    Significant Findings, Care, Treatment and Services Provided: Internal hemorrhoid noted. There were no polyps    Complications: None    Discharge Diagnosis: See above    Medical Problems     Resolved Problems  Date Reviewed: 7/10/2023   None         Condition at Discharge: good         Discharge instructions/Information to patient and family:   See after visit summary for information provided to patient and family. Provisions for Follow-Up Care:  See after visit summary for information related to follow-up care and any pertinent home health orders.       PCP: Rose Clements MD    Disposition: Home

## 2023-07-10 NOTE — ANESTHESIA POSTPROCEDURE EVALUATION
Post-Op Assessment Note    CV Status:  Stable    Pain management: adequate     Mental Status:  Sleepy   Hydration Status:  Euvolemic   PONV Controlled:  Controlled   Airway Patency:  Patent      Post Op Vitals Reviewed: Yes      Staff: Anesthesiologist, CRNA         No notable events documented.     BP   113/59   Temp     Pulse  72   Resp   13   SpO2   99

## 2023-07-10 NOTE — INTERVAL H&P NOTE
H&P reviewed. After examining the patient I find no changes in the patients condition since the H&P had been written.     Vitals:    07/10/23 0833   BP: 141/77   Pulse: 78   Resp: 18   Temp: (!) 96.8 °F (36 °C)   SpO2: 96%

## 2023-07-19 DIAGNOSIS — E78.00 HYPERCHOLESTEREMIA: ICD-10-CM

## 2023-07-19 RX ORDER — ATORVASTATIN CALCIUM 20 MG/1
TABLET, FILM COATED ORAL
Qty: 90 TABLET | Refills: 1 | Status: SHIPPED | OUTPATIENT
Start: 2023-07-19

## 2023-08-21 ENCOUNTER — TELEPHONE (OUTPATIENT)
Dept: FAMILY MEDICINE CLINIC | Facility: CLINIC | Age: 63
End: 2023-08-21

## 2023-08-21 NOTE — TELEPHONE ENCOUNTER
Advise mom not to worry we will order xrays PT specialist whatever he needs at the time of the visit

## 2023-08-21 NOTE — TELEPHONE ENCOUNTER
James called says she's considering making her son an appt with you as a new patient W no insurance. Says her and her  are patient of yours. Before she does she will like to have a brief talk with you so you can guide her in the right direction. Son has a Large hump on his back - Kyphosis- is 35years old. Says he was seen by a previous Dr that he doesn't like - ordered an X-ray over a year ago. PT requires that he has a recent X-ray. Mom is very concern - says he's too young too be this way and wants him to take care of this.     James # [phone]

## 2023-11-08 ENCOUNTER — TELEPHONE (OUTPATIENT)
Age: 63
End: 2023-11-08

## 2023-11-14 ENCOUNTER — HOSPITAL ENCOUNTER (OUTPATIENT)
Dept: RADIOLOGY | Age: 63
Discharge: HOME | End: 2023-11-14
Payer: COMMERCIAL

## 2023-11-14 DIAGNOSIS — M85.89 OSTEOPENIA OF MULTIPLE SITES: ICD-10-CM

## 2023-11-14 PROCEDURE — 77080 DXA BONE DENSITY AXIAL: CPT

## 2023-11-18 ENCOUNTER — HOSPITAL ENCOUNTER (OUTPATIENT)
Dept: RADIOLOGY | Age: 63
Discharge: HOME | End: 2023-11-18
Payer: COMMERCIAL

## 2023-11-18 DIAGNOSIS — Z12.31 ENCOUNTER FOR SCREENING MAMMOGRAM FOR MALIGNANT NEOPLASM OF BREAST: ICD-10-CM

## 2023-11-18 PROCEDURE — 77067 SCR MAMMO BI INCL CAD: CPT

## 2023-11-18 PROCEDURE — 77063 BREAST TOMOSYNTHESIS BI: CPT

## 2023-12-06 ENCOUNTER — TELEPHONE (OUTPATIENT)
Age: 63
End: 2023-12-06

## 2023-12-06 NOTE — TELEPHONE ENCOUNTER
Geo Arthur  11/17/2023 11:00 AM EST       Spoke to pt and she does want to try prolia. Advised her I will work on [facility] with her insurance and get back to her     Patient is calling to see if prolia was approved by insurance. Can we check and call patient back.       Thank you

## 2023-12-12 ENCOUNTER — OFFICE VISIT (OUTPATIENT)
Dept: FAMILY MEDICINE CLINIC | Facility: CLINIC | Age: 63
End: 2023-12-12
Payer: COMMERCIAL

## 2023-12-12 VITALS
WEIGHT: 187 LBS | SYSTOLIC BLOOD PRESSURE: 112 MMHG | DIASTOLIC BLOOD PRESSURE: 82 MMHG | HEART RATE: 96 BPM | RESPIRATION RATE: 18 BRPM | OXYGEN SATURATION: 98 % | BODY MASS INDEX: 34.41 KG/M2 | TEMPERATURE: 98.5 F | HEIGHT: 62 IN

## 2023-12-12 DIAGNOSIS — S16.1XXA ACUTE STRAIN OF NECK MUSCLE, INITIAL ENCOUNTER: ICD-10-CM

## 2023-12-12 DIAGNOSIS — J20.9 ACUTE BRONCHITIS, UNSPECIFIED ORGANISM: Primary | ICD-10-CM

## 2023-12-12 DIAGNOSIS — M79.10 MYALGIA: ICD-10-CM

## 2023-12-12 PROCEDURE — 99214 OFFICE O/P EST MOD 30 MIN: CPT | Performed by: FAMILY MEDICINE

## 2023-12-12 RX ORDER — TIZANIDINE 2 MG/1
2 TABLET ORAL EVERY 8 HOURS PRN
Qty: 15 TABLET | Refills: 0 | Status: SHIPPED | OUTPATIENT
Start: 2023-12-12

## 2023-12-12 RX ORDER — AZITHROMYCIN 250 MG/1
TABLET, FILM COATED ORAL
Qty: 6 TABLET | Refills: 0 | Status: SHIPPED | OUTPATIENT
Start: 2023-12-12 | End: 2023-12-17

## 2023-12-12 NOTE — PROGRESS NOTES
Subjective:      Patient ID: Luis Alberto Floyd is a 58 y.o. female. 2 weeks of cough, congestion, worse at night, cough medicine with some relief, sore throat, fatigue and neck pain, difficulty swallowing  Greenish phlegm, copious  Had neck pain from coughing-posterior neck pain radiating down the left arm, no chest pain, pain feels like a knot in the arm  Rsv 2 years ago    Sore Throat   Associated symptoms include coughing and neck pain. Pertinent negatives include no abdominal pain, congestion, diarrhea, headaches, shortness of breath or trouble swallowing. Fatigue  Associated symptoms include arthralgias (pain in left arm), coughing, fatigue, neck pain and a sore throat. Pertinent negatives include no abdominal pain, chest pain, congestion, fever, headaches, myalgias, nausea or rash. Neck Pain   Pertinent negatives include no chest pain, fever, headaches or trouble swallowing.        Past Medical History:   Diagnosis Date    Abnormal ECG     Arthritis     Cancer ([address])     hydocarcinoma- right hand    Chest pain     Colon polyp     History of mammogram 2017    Hyperlipidemia     Osteopenia     Sleep apnea     dental device       Family History   Problem Relation Age of Onset    Cancer Mother         bladder    Arthritis Mother     Hearing loss Mother     Stroke Mother     Heart disease Father         CARDIAC DISORDER    Cervical cancer Family     No Known Problems Sister     No Known Problems Daughter     No Known Problems Maternal Grandmother     No Known Problems Maternal Grandfather     No Known Problems Paternal Grandmother     No Known Problems Paternal Grandfather     No Known Problems Daughter     No Known Problems Paternal Aunt     No Known Problems Paternal Aunt     No Known Problems Maternal Aunt     No Known Problems Maternal Aunt        Past Surgical History:   Procedure Laterality Date     SECTION      COLONOSCOPY      CYST REMOVAL Right     RIGHT HAND    ECTOPIC PREGNANCY SURGERY HAND SURGERY Right     HERNIA REPAIR      KNEE SURGERY      KNEE ARTHRODESIS    RIGHT    LYMPH NODE BIOPSY      OOPHORECTOMY      ROTATOR CUFF REPAIR      X2    TOTAL ABDOMINAL HYSTERECTOMY          reports that she quit smoking about 32 years ago. Her smoking use included cigarettes. She started smoking about 42 years ago. She has a 5.0 pack-year smoking history. She has never used smokeless tobacco. She reports current alcohol use of about 2.0 standard drinks of alcohol per week. She reports that she does not use drugs. Current Outpatient Medications:     atorvastatin (LIPITOR) 20 mg tablet, TAKE ONE TABLET BY MOUTH DAILY, Disp: 90 tablet, Rfl: 1    azithromycin (Zithromax) 250 mg tablet, Take 2 tablets (500 mg total) by mouth daily for 1 day, THEN 1 tablet (250 mg total) daily for 4 days. , Disp: 6 tablet, Rfl: 0    Calcium Carb-Cholecalciferol (CALCIUM CARBONATE-VITAMIN D3 PO), Take by mouth in the morning chewable, Disp: , Rfl:     estradiol (Vivelle-Dot) 0.075 MG/24HR, Place 1 patch on the skin once a week, Disp: 8 patch, Rfl: 3    tiZANidine (ZANAFLEX) 2 mg tablet, Take 1 tablet (2 mg total) by mouth every 8 (eight) hours as needed for muscle spasms, Disp: 15 tablet, Rfl: 0    XERESE 5-1 % CREA, once as needed, Disp: , Rfl:     The following portions of the patient's history were reviewed and updated as appropriate: allergies, current medications, past family history, past medical history, past social history, past surgical history and problem list.    Review of Systems   Constitutional:  Positive for fatigue. Negative for fever. HENT:  Positive for sore throat. Negative for congestion, facial swelling, mouth sores, rhinorrhea and trouble swallowing. Eyes:  Negative for pain and redness. Respiratory:  Positive for cough. Negative for chest tightness, shortness of breath and wheezing. Cardiovascular:  Negative for chest pain, palpitations and leg swelling.    Gastrointestinal:  Negative for abdominal pain, blood in stool, constipation, diarrhea and nausea. Genitourinary:  Negative for dysuria, hematuria and urgency. Musculoskeletal:  Positive for arthralgias (pain in left arm) and neck pain. Negative for back pain and myalgias. Skin:  Negative for rash and wound. Neurological:  Negative for seizures, syncope and headaches. Hematological:  Negative for adenopathy. Psychiatric/Behavioral:  Negative for agitation and behavioral problems. PHQ-2/9 Depression Screening    Little interest or pleasure in doing things: 0 - not at all  Feeling down, depressed, or hopeless: 0 - not at all  PHQ-2 Score: 0  PHQ-2 Interpretation: Negative depression screen             Objective:    /82 (BP Location: Left arm, Patient Position: Sitting, Cuff Size: Adult)   Pulse 96   Temp 98.5 °F (36.9 °C) (Tympanic)   Resp 18   Ht 5' 2" (1.575 m)   Wt 84.8 kg (187 lb)   SpO2 98%   BMI 34.20 kg/m²      Physical Exam  Vitals and nursing note reviewed. Constitutional:       Appearance: Normal appearance. She is well-developed. She is obese. She is not ill-appearing. HENT:      Head: Normocephalic and atraumatic. Right Ear: Tympanic membrane, ear canal and external ear normal.      Left Ear: Tympanic membrane, ear canal and external ear normal.      Nose: Congestion present. Mouth/Throat:      Mouth: Mucous membranes are moist.      Pharynx: Posterior oropharyngeal erythema present. No oropharyngeal exudate. Eyes:      General: No scleral icterus. Right eye: No discharge. Left eye: No discharge. Conjunctiva/sclera: Conjunctivae normal.   Cardiovascular:      Rate and Rhythm: Normal rate. Heart sounds: No murmur heard. No gallop. Pulmonary:      Effort: Pulmonary effort is normal. No respiratory distress. Breath sounds: Normal breath sounds. No stridor. No wheezing, rhonchi or rales. Abdominal:      Palpations: Abdomen is soft. Tenderness:  There is no abdominal tenderness. Musculoskeletal:         General: Tenderness (left posterior mid arm) present. No deformity. Skin:     Findings: No erythema or rash. Neurological:      Mental Status: She is alert. Mental status is at baseline.    Psychiatric:         Behavior: Behavior normal.         Judgment: Judgment normal.           Recent Results (from the past 8736 hour(s))   Urinalysis with microscopic    Collection Time: 06/20/23  7:20 AM   Result Value Ref Range    Color, UA Light Yellow     Clarity, UA Clear     Specific Gravity, UA 1.022 1.003 - 1.030    pH, UA 6.5 4.5, 5.0, 5.5, 6.0, 6.5, 7.0, 7.5, 8.0    Leukocytes, UA Negative Negative    Nitrite, UA Negative Negative    Protein, UA Trace (A) Negative mg/dl    Glucose, UA Negative Negative mg/dl    Ketones, UA Negative Negative mg/dl    Urobilinogen, UA <2.0 <2.0 mg/dl mg/dl    Bilirubin, UA Negative Negative    Occult Blood, UA Negative Negative    RBC, UA 1-2 None Seen, 1-2 /hpf    WBC, UA 10-20 (A) None Seen, 1-2 /hpf    Epithelial Cells Moderate (A) None Seen, Occasional /hpf    Bacteria, UA Occasional None Seen, Occasional /hpf    MUCUS THREADS Occasional (A) None Seen   CBC and differential    Collection Time: 06/20/23  7:20 AM   Result Value Ref Range    WBC 3.72 (L) 4.31 - 10.16 Thousand/uL    RBC 5.11 3.81 - 5.12 Million/uL    Hemoglobin 13.1 11.5 - 15.4 g/dL    Hematocrit 41.9 34.8 - 46.1 %    MCV 82 82 - 98 fL    MCH 25.6 (L) 26.8 - 34.3 pg    MCHC 31.3 (L) 31.4 - 37.4 g/dL    RDW 15.2 (H) 11.6 - 15.1 %    MPV 12.0 8.9 - 12.7 fL    Platelets 212 608 - 764 Thousands/uL    nRBC 0 /100 WBCs    Neutrophils Relative 53 43 - 75 %    Immat GRANS % 0 0 - 2 %    Lymphocytes Relative 33 14 - 44 %    Monocytes Relative 10 4 - 12 %    Eosinophils Relative 3 0 - 6 %    Basophils Relative 1 0 - 1 %    Neutrophils Absolute 2.01 1.85 - 7.62 Thousands/µL    Immature Grans Absolute 0.00 0.00 - 0.20 Thousand/uL    Lymphocytes Absolute 1.21 0.60 - 4.47 Thousands/µL    Monocytes Absolute 0.38 0.17 - 1.22 Thousand/µL    Eosinophils Absolute 0.10 0.00 - 0.61 Thousand/µL    Basophils Absolute 0.02 0.00 - 0.10 Thousands/µL   Comprehensive metabolic panel    Collection Time: 06/20/23  7:20 AM   Result Value Ref Range    Sodium 139 135 - 147 mmol/L    Potassium 4.1 3.5 - 5.3 mmol/L    Chloride 108 96 - 108 mmol/L    CO2 28 21 - 32 mmol/L    ANION GAP 3 mmol/L    BUN 16 5 - 25 mg/dL    Creatinine 0.56 (L) 0.60 - 1.30 mg/dL    Glucose, Fasting 84 65 - 99 mg/dL    Calcium 8.9 8.3 - 10.1 mg/dL    AST 15 5 - 45 U/L    ALT 25 12 - 78 U/L    Alkaline Phosphatase 55 46 - 116 U/L    Total Protein 7.1 6.4 - 8.4 g/dL    Albumin 3.8 3.5 - 5.0 g/dL    Total Bilirubin 0.33 0.20 - 1.00 mg/dL    eGFR 98 ml/min/1.73sq m   Lipid panel    Collection Time: 06/20/23  7:20 AM   Result Value Ref Range    Cholesterol 224 (H) See Comment mg/dL    Triglycerides 128 See Comment mg/dL    HDL, Direct 52 >=50 mg/dL    LDL Calculated 146 (H) 0 - 100 mg/dL    Non-HDL-Chol (CHOL-HDL) 172 mg/dl   Vitamin D 25 hydroxy    Collection Time: 06/20/23  7:20 AM   Result Value Ref Range    Vit D, 25-Hydroxy 15.7 (L) 30.0 - 100.0 ng/mL   Hepatitis C Antibody    Collection Time: 06/20/23  7:20 AM   Result Value Ref Range    Hepatitis C Ab Non-reactive Non-Reactive   TSH, 3rd generation    Collection Time: 06/20/23  7:20 AM   Result Value Ref Range    TSH 3RD GENERATON 3.682 0.450 - 4.500 uIU/mL   Urinalysis with microscopic    Collection Time: 06/21/23  7:45 AM   Result Value Ref Range    Color, UA Light Yellow     Clarity, UA Clear     Specific Gravity, UA 1.022 1.003 - 1.030    pH, UA 6.5 4.5, 5.0, 5.5, 6.0, 6.5, 7.0, 7.5, 8.0    Leukocytes, UA Negative Negative    Nitrite, UA Negative Negative    Protein, UA Trace (A) Negative mg/dl    Glucose, UA Negative Negative mg/dl    Ketones, UA Negative Negative mg/dl    Urobilinogen, UA <2.0 <2.0 mg/dl mg/dl    Bilirubin, UA Negative Negative    Occult Blood, UA Negative Negative    RBC, UA 1-2 None Seen, 1-2 /hpf    WBC, UA None Seen None Seen, 1-2 /hpf    Epithelial Cells Occasional None Seen, Occasional /hpf    Bacteria, UA Occasional None Seen, Occasional /hpf    MUCUS THREADS Occasional (A) None Seen       Laboratory Results: I have personally reviewed the pertinent laboratory results/reports     Radiology/Other Diagnostic Testing Results: I have personally reviewed pertinent reports. Mammo screening bilateral w 3d & cad    Result Date: 11/21/2023  DIAGNOSIS: Encounter for screening mammogram for malignant neoplasm of breast TECHNIQUE: Digital screening mammography was performed. Computer Aided Detection (CAD) analyzed all applicable images. COMPARISONS: Prior breast imaging dated: 05/16/2022, 05/13/2021, 12/21/2019, 12/17/2018, and 12/16/2017 RELEVANT HISTORY: Family Breast Cancer History: No known family history of breast cancer. Family Medical History: No known relevant family medical history. Personal History: Hormone history includes birth control and estrogen replacement therapy. Surgical history includes hysterectomy and oophorectomy. No known relevant medical history. The patient is scheduled in a reminder system for screening mammography. 8-10% of cancers will be missed on mammography. Management of a palpable abnormality must be based on clinical grounds. Patients will be notified of their results via letter from our facility. Accredited by [employer] of Radiology and FDA. RISK ASSESSMENT: 5 Year Tyrer-Cuzick: 0.51 % 10 Year Tyrer-Cuzick: 0.94 % Lifetime Tyrer-Cuzick: 2.08 % TISSUE DENSITY: The breasts are almost entirely fatty. INDICATION: Maria M Zavaleta is a 58 y.o. female presenting for screening mammography. FINDINGS: There are no suspicious masses, grouped microcalcifications or areas of architectural distortion. The skin and nipple areolar complex are unremarkable. No mammographic evidence of malignancy.  ASSESSMENT/BI-RADS CATEGORY: Left: 1 - Negative Right: 1 - Negative Overall: 1 - Negative RECOMMENDATION:      - Routine screening mammogram in 1 year for both breasts. Workstation ID: [id number]        Assessment/Plan:  Problem List Items Addressed This Visit    None  Visit Diagnoses       Acute bronchitis, unspecified organism    -  Primary    Relevant Medications    azithromycin (Zithromax) 250 mg tablet    Myalgia        Relevant Medications    tiZANidine (ZANAFLEX) 2 mg tablet    Acute strain of neck muscle, initial encounter              Symptoms ongoing and worsening for 5 days, start azithromycin for 5 days, can use OTC mucinex dm prn for cough relief. Pain in arm is constant and likely musculokeletal-can use tizaindine prn  Patient is aware to call 9-1-1 and go to the ER for any red flag symptoms including chest pain, fever >103.5, shortness of breath, dizziness, lightheadedness or syncope or tingling, numbness or weakness of any extremity, headache that is not going away, confusion and more. Depression Screening and Follow-up Plan: Patient was screened for depression during today's encounter. They screened negative with a PHQ-2 score of 0. Read package inserts for all medications before starting a new medications, call me if you have any questions. Patient was given opportunity to ask questions and all questions were answered. Disclaimer: Portions of the record may have been created with voice recognition software. Occasional wrong word or "sound a like" substitutions may have occurred due to the inherent limitations of voice recognition software. Read the chart carefully and recognize, using context, where substitutions have occurred. I have used the Epic copy/forward function to compose this note. I have reviewed my current note to ensure it reflects the current patient status, exam, assessment and plan.

## 2023-12-15 ENCOUNTER — TELEPHONE (OUTPATIENT)
Age: 63
End: 2023-12-15

## 2023-12-15 DIAGNOSIS — R05.1 ACUTE COUGH: Primary | ICD-10-CM

## 2023-12-15 RX ORDER — DEXTROMETHORPHAN HYDROBROMIDE AND PROMETHAZINE HYDROCHLORIDE 15; 6.25 MG/5ML; MG/5ML
5 SYRUP ORAL
Qty: 120 ML | Refills: 0 | Status: SHIPPED | OUTPATIENT
Start: 2023-12-15

## 2023-12-15 NOTE — TELEPHONE ENCOUNTER
Patient was just seen in the office on Tuesday 12/12. She states she has this lingering cough. Last night it was terrible, where she could lay down without coughing. She is wondering if a liquid cough medicine could be prescribed for her. She would like a call back if this is possible. Please advise, thank you.

## 2024-03-05 ENCOUNTER — OFFICE VISIT (OUTPATIENT)
Dept: URGENT CARE | Facility: CLINIC | Age: 64
End: 2024-03-05
Payer: COMMERCIAL

## 2024-03-05 VITALS
OXYGEN SATURATION: 98 % | TEMPERATURE: 98.5 F | SYSTOLIC BLOOD PRESSURE: 137 MMHG | HEART RATE: 110 BPM | DIASTOLIC BLOOD PRESSURE: 68 MMHG

## 2024-03-05 DIAGNOSIS — J01.90 ACUTE BACTERIAL SINUSITIS: Primary | ICD-10-CM

## 2024-03-05 DIAGNOSIS — B96.89 ACUTE BACTERIAL SINUSITIS: Primary | ICD-10-CM

## 2024-03-05 PROCEDURE — 99213 OFFICE O/P EST LOW 20 MIN: CPT | Performed by: STUDENT IN AN ORGANIZED HEALTH CARE EDUCATION/TRAINING PROGRAM

## 2024-03-05 RX ORDER — AMOXICILLIN AND CLAVULANATE POTASSIUM 875; 125 MG/1; MG/1
1 TABLET, FILM COATED ORAL EVERY 12 HOURS SCHEDULED
Qty: 14 TABLET | Refills: 0 | Status: SHIPPED | OUTPATIENT
Start: 2024-03-05 | End: 2024-03-12

## 2024-03-05 NOTE — PROGRESS NOTES
[facility]        NAME: James Hudson is a 64 y.o. female  : 1960    MRN: [id number]  DATE: 2024  TIME: 12:04 PM    Assessment and Orders   Acute bacterial sinusitis [J01.90, B96.89]  1. Acute bacterial sinusitis  amoxicillin-clavulanate (AUGMENTIN) 875-125 mg per tablet            Plan and Discussion      Symptoms and exam consistent with bacterial sinusitis given the prolonged duration of symptoms. Will treat with short course of Augmentin. Discussed supportive care.      Discussed ED precautions including (but not limited to)  Difficultly breathing or shortness of breath  Chest pain  Acutely worsening symptoms.     Risks and benefits discussed. Patient understands and agrees with the plan.     PATIENT INSTRUCTIONS    If tests have been performed at [facility], our office will contact you with results if changes need to be made to the care plan discussed with you at the visit.  You can review your full results on [facility].    Follow up with PCP.     Chief Complaint     Chief Complaint   Patient presents with    Cold Like Symptoms     X 1.5 week cough, body aches, fatigue, sore throat, B/L ear pain when coughing          History of Present Illness       Cough  This is a new problem. The current episode started 1 to 4 weeks ago (10 days). The problem has been gradually worsening. Associated symptoms include ear pain, headaches, nasal congestion, a sore throat and shortness of breath. Pertinent negatives include no fever or wheezing. She has tried prescription cough suppressant for the symptoms. The treatment provided no relief. There is no history of asthma or COPD.       Review of Systems   Review of Systems   Constitutional:  Negative for fever.   HENT:  Positive for ear pain and sore throat.    Respiratory:  Positive for cough and shortness of breath. Negative for wheezing.    Neurological:  Positive for headaches.         Current Medications       Current Outpatient  Medications:     amoxicillin-clavulanate (AUGMENTIN) 875-125 mg per tablet, Take 1 tablet by mouth every 12 (twelve) hours for 7 days, Disp: 14 tablet, Rfl: 0    atorvastatin (LIPITOR) 20 mg tablet, TAKE ONE TABLET BY MOUTH DAILY, Disp: 90 tablet, Rfl: 1    Calcium Carb-Cholecalciferol (CALCIUM CARBONATE-VITAMIN D3 PO), Take by mouth in the morning chewable, Disp: , Rfl:     estradiol (Vivelle-Dot) 0.075 MG/24HR, Place 1 patch on the skin once a week, Disp: 8 patch, Rfl: 3    promethazine-dextromethorphan (PHENERGAN-DM) 6.25-15 mg/5 mL oral syrup, Take 5 mL by mouth daily at bedtime as needed for cough, Disp: 120 mL, Rfl: 0    tiZANidine (ZANAFLEX) 2 mg tablet, Take 1 tablet (2 mg total) by mouth every 8 (eight) hours as needed for muscle spasms, Disp: 15 tablet, Rfl: 0    XERESE 5-1 % CREA, once as needed, Disp: , Rfl:     Current Allergies     Allergies as of 2024    (No Known Allergies)            The following portions of the patient's history were reviewed and updated as appropriate: allergies, current medications, past family history, past medical history, past social history, past surgical history and problem list.     Past Medical History:   Diagnosis Date    Abnormal ECG     Arthritis     Cancer (HCC)     hydocarcinoma- right hand    Chest pain     Colon polyp     History of mammogram 2017    Hyperlipidemia     Osteopenia     Sleep apnea     dental device       Past Surgical History:   Procedure Laterality Date     SECTION      COLONOSCOPY      CYST REMOVAL Right     RIGHT HAND    ECTOPIC PREGNANCY SURGERY      HAND SURGERY Right     HERNIA REPAIR      KNEE SURGERY      KNEE ARTHRODESIS    RIGHT    LYMPH NODE BIOPSY      OOPHORECTOMY      ROTATOR CUFF REPAIR      X2    TOTAL ABDOMINAL HYSTERECTOMY         Family History   Problem Relation Age of Onset    Cancer Mother         bladder    Arthritis Mother     Hearing loss Mother     Stroke Mother     Heart disease Father         CARDIAC  DISORDER    Cervical cancer Family     No Known Problems Sister     No Known Problems Daughter     No Known Problems Maternal Grandmother     No Known Problems Maternal Grandfather     No Known Problems Paternal Grandmother     No Known Problems Paternal Grandfather     No Known Problems Daughter     No Known Problems Paternal Aunt     No Known Problems Paternal Aunt     No Known Problems Maternal Aunt     No Known Problems Maternal Aunt          Medications have been verified.        Objective   /68   Pulse (!) 110   Temp 98.5 °F (36.9 °C)   SpO2 98%   No LMP recorded. Patient has had a hysterectomy.       Physical Exam     Physical Exam  Constitutional:       General: She is not in acute distress.     Appearance: She is not ill-appearing or toxic-appearing.   HENT:      Head: Normocephalic and atraumatic.      Right Ear: Tympanic membrane and external ear normal.      Left Ear: Tympanic membrane and external ear normal.      Nose: Congestion present.      Comments: Boggy nasal turbinates     Mouth/Throat:      Pharynx: Posterior oropharyngeal erythema present.      Comments: Cobblestone appearance in posterior pharynx  Cardiovascular:      Rate and Rhythm: Regular rhythm. Tachycardia present.   Pulmonary:      Effort: Pulmonary effort is normal. No respiratory distress.      Breath sounds: No wheezing.   Neurological:      General: No focal deficit present.      Mental Status: She is alert and oriented to person, place, and time.   Psychiatric:         Mood and Affect: Mood normal.         Behavior: Behavior normal.         Thought Content: Thought content normal.         Judgment: Judgment normal.               Anupama Damon DO

## 2024-03-19 ENCOUNTER — CLINICAL SUPPORT (OUTPATIENT)
Dept: FAMILY MEDICINE CLINIC | Facility: CLINIC | Age: 64
End: 2024-03-19
Payer: COMMERCIAL

## 2024-03-19 DIAGNOSIS — M85.89 OSTEOPENIA OF MULTIPLE SITES: Primary | ICD-10-CM

## 2024-03-19 PROCEDURE — 96372 THER/PROPH/DIAG INJ SC/IM: CPT

## 2024-04-06 ENCOUNTER — LAB (OUTPATIENT)
Dept: LAB | Facility: CLINIC | Age: 64
End: 2024-04-06
Payer: COMMERCIAL

## 2024-04-06 DIAGNOSIS — E78.5 DYSLIPIDEMIA: ICD-10-CM

## 2024-04-06 LAB
CHOLEST SERPL-MCNC: 206 MG/DL
HDLC SERPL-MCNC: 51 MG/DL
LDLC SERPL CALC-MCNC: 131 MG/DL (ref 0–100)
NONHDLC SERPL-MCNC: 155 MG/DL
TRIGL SERPL-MCNC: 118 MG/DL

## 2024-04-06 PROCEDURE — 36415 COLL VENOUS BLD VENIPUNCTURE: CPT

## 2024-04-06 PROCEDURE — 80061 LIPID PANEL: CPT

## 2024-04-09 DIAGNOSIS — E78.00 HYPERCHOLESTEREMIA: Primary | ICD-10-CM

## 2024-06-18 ENCOUNTER — TELEPHONE (OUTPATIENT)
Age: 64
End: 2024-06-18

## 2024-06-18 DIAGNOSIS — K64.1 GRADE II HEMORRHOIDS: Primary | ICD-10-CM

## 2024-06-18 RX ORDER — HYDROCORTISONE 25 MG/G
CREAM TOPICAL 3 TIMES DAILY PRN
Qty: 28 G | Refills: 1 | Status: SHIPPED | OUTPATIENT
Start: 2024-06-18

## 2024-06-18 NOTE — TELEPHONE ENCOUNTER
[facility]  [address] [place] [address] [address] [id number]  Phone: [phone]  Fax: AUSTYN Longo        November 9, 2021     Patient: Mao Carpenter   YOB: 2018   Date of Visit: 11/9/2021       To Whom it May Concern:    Jese Sims was seen in my clinic on 11/9/2021. She may return to  11/10/2021. If you have any questions or concerns, please don't hesitate to call.     Sincerely,         AUSTYN Xiao Advise I  sent over proctocream

## 2024-06-18 NOTE — TELEPHONE ENCOUNTER
Pt called in stating that she would like to get hemorrhoid cream prescribed as she's had cream prescribed in the past, pt unsure of who prescribed it. Pt was notified that she would need to call her PCP to be further evaluated or to request hemorrhoid cream, pt verbalized understanding, no further questions at this time.

## 2024-06-18 NOTE — TELEPHONE ENCOUNTER
Patient called in this morning to inform PCP that her Hemorrhoids are bad; too painful to sit; no blood when wiping or in stool at this time.. GYN requested patient to follow up with PCP. Patient reports using Preparation-H cream for past 3 days with no relief. Colonoscopy 7/10/23 with internal hemorrhoids noted; GI provider has retired. Requesting order for other medication. Please follow up with patient for provider response or order.

## 2024-07-05 DIAGNOSIS — N95.1 MENOPAUSAL SYMPTOMS: ICD-10-CM

## 2024-07-05 NOTE — TELEPHONE ENCOUNTER
Reason for call:   [x] Refill   [] Prior Auth  [x] Other: pt stated she called pharm first and they told her no refill left.    Office:   [] PCP/Provider -   [x] Specialty/Provider - objuann/Remington Thorne PA-C     Medication: estradiol (Vivelle-Dot) 0.075 MG/24HR     Dose/Frequency:     Place 1 patch on the skin once a week       Quantity: 8    Pharmacy: [id number]  [place] - [id number] [phone]       Does the patient have enough for 3 days?   [x] Yes   [] No - Send as HP to POD

## 2024-07-09 ENCOUNTER — TELEPHONE (OUTPATIENT)
Dept: OBGYN CLINIC | Facility: CLINIC | Age: 64
End: 2024-07-09

## 2024-07-09 RX ORDER — ESTRADIOL 0.07 MG/D
1 FILM, EXTENDED RELEASE TRANSDERMAL WEEKLY
Qty: 8 PATCH | Refills: 0 | Status: SHIPPED | OUTPATIENT
Start: 2024-07-09

## 2024-08-19 ENCOUNTER — OFFICE VISIT (OUTPATIENT)
Dept: URGENT CARE | Facility: CLINIC | Age: 64
End: 2024-08-19
Payer: COMMERCIAL

## 2024-08-19 VITALS
HEART RATE: 79 BPM | TEMPERATURE: 97.7 F | DIASTOLIC BLOOD PRESSURE: 57 MMHG | RESPIRATION RATE: 16 BRPM | SYSTOLIC BLOOD PRESSURE: 123 MMHG
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
## 2024-08-19 PROCEDURE — G0383 LEV 4 HOSP TYPE B ED VISIT: HCPCS | Performed by: PHYSICIAN ASSISTANT

## 2024-08-19 PROCEDURE — S9083 URGENT CARE CENTER GLOBAL: HCPCS | Performed by: PHYSICIAN ASSISTANT

## 2024-08-19 RX ORDER — PREDNISONE 20 MG/1
40 TABLET ORAL DAILY
Qty: 10 TABLET | Refills: 0 | Status: SHIPPED | OUTPATIENT
Start: 2024-08-19 | End: 2024-08-24

## 2024-08-19 RX ORDER — TRIAMCINOLONE ACETONIDE 1 MG/G
CREAM TOPICAL 2 TIMES DAILY
Qty: 80 G | Refills: 0 | Status: SHIPPED | OUTPATIENT
Start: 2024-08-19

## 2024-08-19 NOTE — PATIENT INSTRUCTIONS
Recommend over-the-counter means such as Zyrtec, Claritin, Xyzal, or Allegra.  May also use Benadryl but Benadryl make you tired.  Take prednisone as prescribed.  Use triamcinolone ointment as prescribed.  Do not apply to face or genitals and do not use more than 2 weeks.  Follow-up with PCP in 5 days if symptoms or not improved.  If symptoms worsen or new symptoms develop report to the emergency room

## 2024-08-19 NOTE — PROGRESS NOTES
[facility]        NAME: James Hudson is a 64 y.o. female  : 1960    MRN: [id number]  DATE: 2024  TIME: 3:48 PM    Assessment and Plan   Insect bite, unspecified site, initial encounter [W57.XXXA]  1. Insect bite, unspecified site, initial encounter  predniSONE 20 mg tablet    triamcinolone (KENALOG) 0.1 % cream      Multiple insect bites to lower legs, recommend prednisone and triamcinolone ointment to treat.       Patient Instructions     Patient Instructions   Recommend over-the-counter means such as Zyrtec, Claritin, Xyzal, or Allegra.  May also use Benadryl but Benadryl make you tired.  Take prednisone as prescribed.  Use triamcinolone ointment as prescribed.  Do not apply to face or genitals and do not use more than 2 weeks.  Follow-up with PCP in 5 days if symptoms or not improved.  If symptoms worsen or new symptoms develop report to the emergency room    Follow up with PCP in 3-5 days.  Proceed to  ER if symptoms worsen.    If tests have been performed at [facility], our office will contact you with results if changes need to be made to the care plan discussed with you at the visit. You can review your full results on [facility].     Chief Complaint     Chief Complaint   Patient presents with    Insect Bite     States she was in the garden 2 days ago and received multiple bites. USING Hydrocortisone cream.          History of Present Illness       64 year old female presents with complaint of multiple insect bites to her lower legs. Pt reports she was working in her garden on Saturday and woke ups with itchy red bites on her legs the next morning. Has tried OTC hydrocortisone with minimal relief.         Review of Systems   Review of Systems   Constitutional:  Negative for chills and fever.   Skin:  Positive for rash.         Current Medications       Current Outpatient Medications:     estradiol (Vivelle-Dot) 0.075 MG/24HR, Place 1 patch on the skin once a week, Disp: 8  patch, Rfl: 0    predniSONE 20 mg tablet, Take 2 tablets (40 mg total) by mouth daily for 5 days, Disp: 10 tablet, Rfl: 0    triamcinolone (KENALOG) 0.1 % cream, Apply topically 2 (two) times a day, Disp: 80 g, Rfl: 0    atorvastatin (LIPITOR) 20 mg tablet, TAKE ONE TABLET BY MOUTH DAILY (Patient not taking: Reported on 2024), Disp: 90 tablet, Rfl: 1    Calcium Carb-Cholecalciferol (CALCIUM CARBONATE-VITAMIN D3 PO), Take by mouth in the morning chewable (Patient not taking: Reported on 2024), Disp: , Rfl:     hydrocortisone (Proctosol HC) 2.5 % rectal cream, Apply topically 3 (three) times a day as needed for hemorrhoids (Patient not taking: Reported on 2024), Disp: 28 g, Rfl: 1    promethazine-dextromethorphan (PHENERGAN-DM) 6.25-15 mg/5 mL oral syrup, Take 5 mL by mouth daily at bedtime as needed for cough, Disp: 120 mL, Rfl: 0    tiZANidine (ZANAFLEX) 2 mg tablet, Take 1 tablet (2 mg total) by mouth every 8 (eight) hours as needed for muscle spasms (Patient not taking: Reported on 2024), Disp: 15 tablet, Rfl: 0    XERESE 5-1 % CREA, once as needed (Patient not taking: Reported on 2024), Disp: , Rfl:     Current Facility-Administered Medications:     denosumab (PROLIA) subcutaneous injection 60 mg, 60 mg, Subcutaneous, Q6 Months, , 60 mg at 24 1617    Current Allergies     Allergies as of 2024    (No Known Allergies)            The following portions of the patient's history were reviewed and updated as appropriate: allergies, current medications, past family history, past medical history, past social history, past surgical history and problem list.     Past Medical History:   Diagnosis Date    Abnormal ECG     Arthritis     Cancer (HCC)     hydocarcinoma- right hand    Chest pain     Colon polyp     History of mammogram 2017    Hyperlipidemia     Osteopenia     Sleep apnea     dental device       Past Surgical History:   Procedure Laterality Date     SECTION       COLONOSCOPY      CYST REMOVAL Right     RIGHT HAND    ECTOPIC PREGNANCY SURGERY      HAND SURGERY Right     HERNIA REPAIR      KNEE SURGERY      KNEE ARTHRODESIS    RIGHT    LYMPH NODE BIOPSY      OOPHORECTOMY      ROTATOR CUFF REPAIR      X2    TOTAL ABDOMINAL HYSTERECTOMY         Family History   Problem Relation Age of Onset    Cancer Mother         bladder    Arthritis Mother     Hearing loss Mother     Stroke Mother     Heart disease Father         CARDIAC DISORDER    Cervical cancer Family     No Known Problems Sister     No Known Problems Daughter     No Known Problems Maternal Grandmother     No Known Problems Maternal Grandfather     No Known Problems Paternal Grandmother     No Known Problems Paternal Grandfather     No Known Problems Daughter     No Known Problems Paternal Aunt     No Known Problems Paternal Aunt     No Known Problems Maternal Aunt     No Known Problems Maternal Aunt          Medications have been verified.        Objective   /57   Pulse 79   Temp 97.7 °F (36.5 °C) (Temporal)   Resp 16   No LMP recorded. Patient has had a hysterectomy.       Physical Exam     Physical Exam  Vitals and nursing note reviewed.   Constitutional:       General: She is awake. She is not in acute distress.     Appearance: Normal appearance. She is well-developed and well-groomed. She is not ill-appearing, toxic-appearing or diaphoretic.   HENT:      Head: Normocephalic and atraumatic.      Right Ear: Hearing and external ear normal.      Left Ear: Hearing and external ear normal.   Eyes:      General: Lids are normal. Vision grossly intact. Gaze aligned appropriately.   Cardiovascular:      Rate and Rhythm: Normal rate.   Pulmonary:      Effort: Pulmonary effort is normal.      Comments: Patient is speaking in full sentences with no increased respiratory effort. No audible wheezing or stridor.   Musculoskeletal:      Cervical back: Normal range of motion.   Skin:     General: Skin is warm and dry.       "Comments: Multiple erythematous lesions with raised centers consistent with mosquito bites.    Neurological:      Mental Status: She is alert and oriented to person, place, and time.      Coordination: Coordination is intact.      Gait: Gait is intact.   Psychiatric:         Attention and Perception: Attention and perception normal.         Mood and Affect: Mood and affect normal.         Speech: Speech normal.         Behavior: Behavior normal. Behavior is cooperative.               Note: Portions of this record may have been created with voice recognition software. Occasional wrong word or \"sound a like\" substitutions may have occurred due to the inherent limitations of voice recognition software. Please read the chart carefully and recognize, using context, where substitutions have occurred.*      "

## 2024-08-29 NOTE — PROGRESS NOTES
Subjective      James Hudson is a 64 y.o. female who presents for annual GYN exam.     GYN:  S/P hysterectomy.  Denies vaginal discharge, labial erythema or lesions, dyspareunia.  Patient is  sexually active with .      OB:  OB History    Para Term  AB Living   4 3 3   1 3   SAB IAB Ectopic Multiple Live Births       1   3      # Outcome Date GA Lbr Jose Elias/2nd Weight Sex Type Anes PTL Lv   4 Ectopic               Birth Comments: s/p salpingectomy   3 Term     F CS-Unspec   CINTIA   2 Term     F CS-Unspec   CINTIA   1 Term     M CS-Unspec   CINTIA      Obstetric Comments   : 3 C/S and 1 ectopic pregnancy s/p salpingectomy         :  Denies dysuria, urinary frequency or urgency.  Denies hematuria, flank pain, incontinence.    Breast:  Denies breast mass, skin changes, dimpling, reddening, nipple retraction.  Denies breast discharge.       Cancer-related family history includes Cancer in her mother; Cervical cancer in her family.    Past Medical History:   Diagnosis Date    Abnormal ECG     Arthritis     Cancer (HCC)     hydocarcinoma- right hand    Chest pain     Colon polyp     History of mammogram 2017    Hyperlipidemia     Osteopenia     Sleep apnea     dental device       Past Surgical History:   Procedure Laterality Date     SECTION      COLONOSCOPY      CYST REMOVAL Right     RIGHT HAND    ECTOPIC PREGNANCY SURGERY      HAND SURGERY Right     HERNIA REPAIR      KNEE SURGERY      KNEE ARTHRODESIS    RIGHT    LYMPH NODE BIOPSY      OOPHORECTOMY      ROTATOR CUFF REPAIR      X2    TOTAL ABDOMINAL HYSTERECTOMY           General:    Safety: feels safe at home    Social History     Tobacco Use    Smoking status: Former     Current packs/day: 0.00     Average packs/day: 0.5 packs/day for 10.0 years (5.0 ttl pk-yrs)     Types: Cigarettes     Start date: 1981     Quit date: 1991     Years since quittin.6    Smokeless tobacco: Never   Vaping Use    Vaping status: Never Used  "  Substance Use Topics    Alcohol use: Yes     Alcohol/week: 2.0 standard drinks of alcohol     Types: 2 Standard drinks or equivalent per week     Comment: SOCIAL    Drug use: No       Screening:  Cervical cancer: last pap smear in Not on file.   Breast cancer: last mammogram in 11/18/2023. Results were BIRADS1.  Colon cancer: last colonoscopy in 07/10/2023        Review of Systems   Constitutional:  Negative for fatigue.   Eyes:  Negative for photophobia and visual disturbance.   Respiratory:  Negative for cough and shortness of breath.    Cardiovascular:  Negative for chest pain and palpitations.   Gastrointestinal:  Negative for abdominal pain, blood in stool, constipation, diarrhea, nausea and rectal pain.   Genitourinary:  Negative for dyspareunia, dysuria, flank pain, frequency, genital sores, menstrual problem, pelvic pain, urgency, vaginal bleeding, vaginal discharge and vaginal pain.   Musculoskeletal:  Negative for arthralgias and back pain.   Skin:  Negative for rash.   Neurological:  Negative for weakness and headaches.          Objective      /86 (BP Location: Left arm, Patient Position: Sitting, Cuff Size: Standard)   Ht 5' 2\" (1.575 m)   Wt 83.5 kg (184 lb)   BMI 33.65 kg/m²   Physical Exam  Vitals and nursing note reviewed.   Constitutional:       Appearance: Normal appearance.   HENT:      Head: Normocephalic.   Eyes:      Conjunctiva/sclera: Conjunctivae normal.   Cardiovascular:      Rate and Rhythm: Normal rate and regular rhythm.      Heart sounds: Normal heart sounds.   Pulmonary:      Effort: Pulmonary effort is normal.      Breath sounds: Normal breath sounds.   Chest:   Breasts:     Right: Normal. No inverted nipple, mass, nipple discharge, skin change or tenderness.      Left: Normal. No inverted nipple, mass, nipple discharge, skin change or tenderness.   Abdominal:      General: Abdomen is flat.      Palpations: Abdomen is soft. There is no mass.      Tenderness: There is no " abdominal tenderness. There is no right CVA tenderness or left CVA tenderness.   Genitourinary:     General: Normal vulva.      Exam position: Lithotomy position.      Pubic Area: No rash or pubic lice.       Labia:         Right: No rash or tenderness.         Left: No rash or tenderness.       Urethra: No prolapse or urethral pain.      Vagina: Normal. No vaginal discharge.      Adnexa: Right adnexa normal and left adnexa normal.        Right: No mass or tenderness.          Left: No mass or tenderness.        Comments: Surgically absent cervix and uterus. Pinpoint varicosities.  Musculoskeletal:         General: Normal range of motion.      Cervical back: Normal range of motion. No tenderness.      Right lower leg: No edema.      Left lower leg: No edema.   Lymphadenopathy:      Cervical: No cervical adenopathy.      Upper Body:      Right upper body: No supraclavicular or axillary adenopathy.      Left upper body: No supraclavicular or axillary adenopathy.      Lower Body: No right inguinal adenopathy. No left inguinal adenopathy.   Skin:     General: Skin is warm and dry.      Findings: No rash.   Neurological:      Mental Status: She is alert and oriented to person, place, and time.   Psychiatric:         Mood and Affect: Mood normal.         Behavior: Behavior normal.         Thought Content: Thought content normal.         Judgment: Judgment normal.                 Assessment/Plan  Problem List Items Addressed This Visit       Other fatigue    Relevant Orders    CBC and differential    Comprehensive metabolic panel    TSH, 3rd generation with Free T4 reflex    Vitamin D 1,25 dihydroxy    Iron Panel (Includes Ferritin, Iron Sat%, Iron, and TIBC)     Other Visit Diagnoses       Well woman exam    -  Primary            Denies GYN concerns today  She reports fatigue and low back pain. Advised to see PCP.   Cervical cancer screening: deferred; hysterectomy  Breast Cancer screening: UTD, 2024 is scheduled  Colon  cancer Screening: UTD  STD screening: declines  Reviewed healthy lifestyle and safe sex practices  RTO for annual exam or PRN      RAIN S[facility]  OB/GYN  8/30/2024  4:23 PM

## 2024-08-30 ENCOUNTER — ANNUAL EXAM (OUTPATIENT)
Dept: OBGYN CLINIC | Facility: CLINIC | Age: 64
End: 2024-08-30
Payer: COMMERCIAL

## 2024-08-30 VITALS
WEIGHT: 184 LBS | HEIGHT: 62 IN | SYSTOLIC BLOOD PRESSURE: 128 MMHG | DIASTOLIC BLOOD PRESSURE: 86 MMHG | BODY MASS INDEX: 33.86 KG/M2

## 2024-08-30 DIAGNOSIS — Z01.419 WELL WOMAN EXAM: Primary | ICD-10-CM

## 2024-08-30 DIAGNOSIS — R53.83 OTHER FATIGUE: ICD-10-CM

## 2024-08-30 PROCEDURE — S0612 ANNUAL GYNECOLOGICAL EXAMINA: HCPCS

## 2024-09-26 ENCOUNTER — CLINICAL SUPPORT (OUTPATIENT)
Dept: FAMILY MEDICINE CLINIC | Facility: CLINIC | Age: 64
End: 2024-09-26
Payer: COMMERCIAL

## 2024-09-26 DIAGNOSIS — M85.89 OSTEOPENIA OF MULTIPLE SITES: Primary | ICD-10-CM

## 2024-09-26 PROCEDURE — 96372 THER/PROPH/DIAG INJ SC/IM: CPT

## 2024-10-01 ENCOUNTER — OFFICE VISIT (OUTPATIENT)
Dept: FAMILY MEDICINE CLINIC | Facility: CLINIC | Age: 64
End: 2024-10-01
Payer: COMMERCIAL

## 2024-10-01 ENCOUNTER — HOSPITAL ENCOUNTER (OUTPATIENT)
Dept: RADIOLOGY | Facility: HOSPITAL | Age: 64
Discharge: HOME | End: 2024-10-01
Payer: COMMERCIAL

## 2024-10-01 VITALS
BODY MASS INDEX: 34.6 KG/M2 | TEMPERATURE: 98 F | WEIGHT: 188 LBS | HEIGHT: 62 IN | HEART RATE: 92 BPM | SYSTOLIC BLOOD PRESSURE: 122 MMHG | OXYGEN SATURATION: 97 % | RESPIRATION RATE: 16 BRPM | DIASTOLIC BLOOD PRESSURE: 78 MMHG

## 2024-10-01 DIAGNOSIS — M25.551 RIGHT HIP PAIN: ICD-10-CM

## 2024-10-01 DIAGNOSIS — E66.09 CLASS 1 OBESITY DUE TO EXCESS CALORIES WITH SERIOUS COMORBIDITY AND BODY MASS INDEX (BMI) OF 34.0 TO 34.9 IN ADULT: ICD-10-CM

## 2024-10-01 DIAGNOSIS — G47.33 OBSTRUCTIVE SLEEP APNEA SYNDROME: ICD-10-CM

## 2024-10-01 DIAGNOSIS — Z00.00 ANNUAL PHYSICAL EXAM: ICD-10-CM

## 2024-10-01 DIAGNOSIS — E78.5 DYSLIPIDEMIA: Primary | ICD-10-CM

## 2024-10-01 DIAGNOSIS — N95.1 MENOPAUSAL SYMPTOMS: ICD-10-CM

## 2024-10-01 DIAGNOSIS — E66.811 CLASS 1 OBESITY DUE TO EXCESS CALORIES WITH SERIOUS COMORBIDITY AND BODY MASS INDEX (BMI) OF 34.0 TO 34.9 IN ADULT: ICD-10-CM

## 2024-10-01 DIAGNOSIS — M81.0 AGE-RELATED OSTEOPOROSIS WITHOUT CURRENT PATHOLOGICAL FRACTURE: ICD-10-CM

## 2024-10-01 PROCEDURE — 99396 PREV VISIT EST AGE 40-64: CPT | Performed by: FAMILY MEDICINE

## 2024-10-01 PROCEDURE — 99397 PER PM REEVAL EST PAT 65+ YR: CPT | Performed by: FAMILY MEDICINE

## 2024-10-01 PROCEDURE — 73502 X-RAY EXAM HIP UNI 2-3 VIEWS: CPT

## 2024-10-01 PROCEDURE — 99214 OFFICE O/P EST MOD 30 MIN: CPT | Performed by: FAMILY MEDICINE

## 2024-10-01 NOTE — ASSESSMENT & PLAN NOTE
Discussion on diet and exercise guidelines for weight loss and  health reviewed with pt ; pt wants wegovy side effects and warnings given hair loss ozempic face NVD gastroparesis      Orders:    Semaglutide-Weight Management (WEGOVY) 0.25 MG/0.5ML; Inject 0.5 mL (0.25 mg total) under the skin once a week

## 2024-10-01 NOTE — PROGRESS NOTES
Ambulatory Visit  Name: James Hudson      : 1960      MRN: [id number]  Encounter Provider: Zuri Palencia MD  Encounter Date: 10/1/2024   Encounter department: [facility]    Assessment & Plan  Dyslipidemia  Pt not taking the lipitor check lipids     Orders:    Lipid panel; Future    Class 1 obesity due to excess calories with serious comorbidity and body mass index (BMI) of 34.0 to 34.9 in adult  Discussion on diet and exercise guidelines for weight loss and  health reviewed with pt ; pt wants wegovy side effects and warnings given hair loss ozempic face NVD gastroparesis      Orders:    Semaglutide-Weight Management (WEGOVY) 0.25 MG/0.5ML; Inject 0.5 mL (0.25 mg total) under the skin once a week    Age-related osteoporosis without current pathological fracture  Pt borderline weight bearing exercise vit D calcium  is  on prolia          Annual physical exam  Check routine labs             Right hip pain  Check  xray hip    Orders:    XR hip/pelv 2-3 vws right if performed; Future    Obstructive sleep apnea syndrome  ENT note reviewed home study result pending          Menopausal symptoms  On estradiol            History of Present Illness     Patient here for annual physical Pt is here for interval visit and evaluation of multiple medical problems, review of medications, labs, Health Maintenance and any recent specialty consults ENT pt wants wegovy for weight loss         Leg Pain   Pertinent negatives include no numbness.         Review of Systems   Constitutional:  Negative for appetite change, chills, fatigue and fever.   Respiratory:  Negative for cough, chest tightness and shortness of breath.    Cardiovascular:  Negative for chest pain, palpitations and leg swelling.   Gastrointestinal:  Negative for abdominal pain, constipation, diarrhea, nausea and vomiting.   Genitourinary:  Negative for difficulty urinating and frequency.   Musculoskeletal:  Positive for arthralgias  "(right hip pain). Negative for back pain, gait problem and neck pain.   Skin:  Negative for rash.   Neurological:  Negative for dizziness, weakness, light-headedness, numbness and headaches.   Hematological:  Does not bruise/bleed easily.   Psychiatric/Behavioral:  Negative for dysphoric mood and sleep disturbance. The patient is not nervous/anxious.            Objective     /78 (BP Location: Left arm, Patient Position: Sitting, Cuff Size: Standard)   Pulse 92   Temp 98 °F (36.7 °C) (Tympanic)   Resp 16   Ht 5' 2\" (1.575 m)   Wt 85.3 kg (188 lb)   SpO2 97%   BMI 34.39 kg/m²     Physical Exam  Vitals and nursing note reviewed.   Constitutional:       General: She is not in acute distress.     Appearance: Normal appearance. She is well-developed. She is obese.   HENT:      Head: Normocephalic and atraumatic.      Right Ear: Tympanic membrane, ear canal and external ear normal.      Left Ear: Tympanic membrane, ear canal and external ear normal.      Nose: Nose normal.      Mouth/Throat:      Mouth: Mucous membranes are moist.      Pharynx: Oropharynx is clear. No oropharyngeal exudate or posterior oropharyngeal erythema.   Eyes:      Extraocular Movements: Extraocular movements intact.      Conjunctiva/sclera: Conjunctivae normal.      Pupils: Pupils are equal, round, and reactive to light.   Cardiovascular:      Rate and Rhythm: Normal rate and regular rhythm.      Pulses: Normal pulses.      Heart sounds: Normal heart sounds. No murmur heard.  Pulmonary:      Effort: Pulmonary effort is normal. No respiratory distress.      Breath sounds: Normal breath sounds. No wheezing or rales.   Abdominal:      General: Bowel sounds are normal. There is no distension.      Palpations: Abdomen is soft. There is no mass.      Tenderness: There is no abdominal tenderness. There is no right CVA tenderness, left CVA tenderness, guarding or rebound.      Hernia: No hernia is present.   Musculoskeletal:         General: " Tenderness (righ hip tenderness pain with int and et rotation) present. No swelling. Normal range of motion.      Cervical back: Normal range of motion and neck supple.   Skin:     General: Skin is warm and dry.      Capillary Refill: Capillary refill takes less than 2 seconds.      Findings: No rash.      Comments: No abn  moles seen   Neurological:      General: No focal deficit present.      Mental Status: She is alert and oriented to person, place, and time.      Cranial Nerves: No cranial nerve deficit.      Sensory: No sensory deficit.      Motor: No weakness.      Coordination: Coordination normal.      Gait: Gait normal.      Deep Tendon Reflexes: Reflexes normal.   Psychiatric:         Mood and Affect: Mood normal.         Behavior: Behavior normal.

## 2024-10-02 ENCOUNTER — TELEPHONE (OUTPATIENT)
Age: 64
End: 2024-10-02

## 2024-10-02 DIAGNOSIS — N95.1 MENOPAUSAL SYMPTOMS: ICD-10-CM

## 2024-10-02 NOTE — TELEPHONE ENCOUNTER
PA for Semaglutide-Weight Management (WEGOVY) 0.25 MG/0.5ML SUBMITTED     via    []CMM-KEY:   [x]Surescripts-Case ID # [id number]  []Availity-Auth ID # NDC #   []Faxed to plan   []Other website   []Phone call Case ID #     Office notes sent, clinical questions answered. Awaiting determination    Turnaround time for your insurance to make a decision on your Prior Authorization can take 7-21 business days.

## 2024-10-03 ENCOUNTER — TELEPHONE (OUTPATIENT)
Age: 64
End: 2024-10-03

## 2024-10-03 RX ORDER — ESTRADIOL 0.07 MG/D
1 FILM, EXTENDED RELEASE TRANSDERMAL WEEKLY
Qty: 8 PATCH | Refills: 3 | Status: SHIPPED | OUTPATIENT
Start: 2024-10-03

## 2024-10-03 NOTE — TELEPHONE ENCOUNTER
Patient calling in re: to x-ray results, advised the results have not been released yet but as soon as they are and Dr. Palencia reviews, we will call her.

## 2024-10-03 NOTE — TELEPHONE ENCOUNTER
PA for WEGOVY 0.25MG APPROVED     Date(s) approved  September 2, 2024 to April 30, 2025     Case #  [id number]     Patient advised by          [x]Ticket M[employer] Message  []Phone call   []LMOM  []L/M to call office as no active Communication consent on file  []Unable to leave detailed message as VM not approved on Communication consent       Pharmacy advised by    [x]Fax  []Phone call

## 2024-10-04 DIAGNOSIS — M25.559 HIP PAIN, UNSPECIFIED LATERALITY: Primary | ICD-10-CM

## 2024-10-04 RX ORDER — CELECOXIB 200 MG/1
200 CAPSULE ORAL DAILY
Qty: 30 CAPSULE | Refills: 1 | Status: SHIPPED | OUTPATIENT
Start: 2024-10-04

## 2024-10-04 NOTE — TELEPHONE ENCOUNTER
FYI: Pt wanted to know if the results of Xray were received as pt is still in pain. Pt was told if she is in pain go to the ER. Pt is highly concerned. However, when results are in pt will be contacted.

## 2024-10-08 ENCOUNTER — APPOINTMENT (OUTPATIENT)
Dept: LAB | Facility: MEDICAL CENTER | Age: 64
End: 2024-10-08
Payer: COMMERCIAL

## 2024-10-08 DIAGNOSIS — E78.00 HYPERCHOLESTEREMIA: ICD-10-CM

## 2024-10-08 DIAGNOSIS — E78.5 DYSLIPIDEMIA: ICD-10-CM

## 2024-10-08 DIAGNOSIS — R53.83 OTHER FATIGUE: ICD-10-CM

## 2024-10-08 LAB
ALBUMIN SERPL BCG-MCNC: 4.6 G/DL (ref 3.5–5)
ALP SERPL-CCNC: 39 U/L (ref 34–104)
ALT SERPL W P-5'-P-CCNC: 15 U/L (ref 7–52)
ANION GAP SERPL CALCULATED.3IONS-SCNC: 8 MMOL/L (ref 4–13)
AST SERPL W P-5'-P-CCNC: 17 U/L (ref 13–39)
BASOPHILS # BLD AUTO: 0.02 THOUSANDS/ΜL (ref 0–0.1)
BASOPHILS NFR BLD AUTO: 1 % (ref 0–1)
BILIRUB SERPL-MCNC: 0.33 MG/DL (ref 0.2–1)
BUN SERPL-MCNC: 17 MG/DL (ref 5–25)
CALCIUM SERPL-MCNC: 9 MG/DL (ref 8.4–10.2)
CHLORIDE SERPL-SCNC: 103 MMOL/L (ref 96–108)
CHOLEST SERPL-MCNC: 217 MG/DL
CO2 SERPL-SCNC: 28 MMOL/L (ref 21–32)
CREAT SERPL-MCNC: 0.56 MG/DL (ref 0.6–1.3)
EOSINOPHIL # BLD AUTO: 0.07 THOUSAND/ΜL (ref 0–0.61)
EOSINOPHIL NFR BLD AUTO: 2 % (ref 0–6)
ERYTHROCYTE [DISTWIDTH] IN BLOOD BY AUTOMATED COUNT: 15.6 % (ref 11.6–15.1)
FERRITIN SERPL-MCNC: 77 NG/ML (ref 11–307)
GFR SERPL CREATININE-BSD FRML MDRD: 98 ML/MIN/1.73SQ M
GLUCOSE P FAST SERPL-MCNC: 80 MG/DL (ref 65–99)
HCT VFR BLD AUTO: 45 % (ref 34.8–46.1)
HDLC SERPL-MCNC: 51 MG/DL
HGB BLD-MCNC: 13.6 G/DL (ref 11.5–15.4)
IMM GRANULOCYTES # BLD AUTO: 0.01 THOUSAND/UL (ref 0–0.2)
IMM GRANULOCYTES NFR BLD AUTO: 0 % (ref 0–2)
IRON SATN MFR SERPL: 17 % (ref 15–50)
IRON SERPL-MCNC: 67 UG/DL (ref 50–212)
LDLC SERPL CALC-MCNC: 140 MG/DL (ref 0–100)
LYMPHOCYTES # BLD AUTO: 1.54 THOUSANDS/ΜL (ref 0.6–4.47)
LYMPHOCYTES NFR BLD AUTO: 39 % (ref 14–44)
MCH RBC QN AUTO: 25.4 PG (ref 26.8–34.3)
MCHC RBC AUTO-ENTMCNC: 30.2 G/DL (ref 31.4–37.4)
MCV RBC AUTO: 84 FL (ref 82–98)
MONOCYTES # BLD AUTO: 0.38 THOUSAND/ΜL (ref 0.17–1.22)
MONOCYTES NFR BLD AUTO: 10 % (ref 4–12)
NEUTROPHILS # BLD AUTO: 1.95 THOUSANDS/ΜL (ref 1.85–7.62)
NEUTS SEG NFR BLD AUTO: 48 % (ref 43–75)
NONHDLC SERPL-MCNC: 166 MG/DL
NRBC BLD AUTO-RTO: 0 /100 WBCS
PLATELET # BLD AUTO: 196 THOUSANDS/UL (ref 149–390)
PMV BLD AUTO: 12 FL (ref 8.9–12.7)
POTASSIUM SERPL-SCNC: 4.6 MMOL/L (ref 3.5–5.3)
PROT SERPL-MCNC: 7.1 G/DL (ref 6.4–8.4)
RBC # BLD AUTO: 5.36 MILLION/UL (ref 3.81–5.12)
SODIUM SERPL-SCNC: 139 MMOL/L (ref 135–147)
T4 FREE SERPL-MCNC: 0.82 NG/DL (ref 0.61–1.12)
TIBC SERPL-MCNC: 396 UG/DL (ref 250–450)
TRIGL SERPL-MCNC: 132 MG/DL
TSH SERPL DL<=0.05 MIU/L-ACNC: 4.63 UIU/ML (ref 0.45–4.5)
UIBC SERPL-MCNC: 329 UG/DL (ref 155–355)
WBC # BLD AUTO: 3.97 THOUSAND/UL (ref 4.31–10.16)

## 2024-10-08 PROCEDURE — 84443 ASSAY THYROID STIM HORMONE: CPT

## 2024-10-08 PROCEDURE — 80061 LIPID PANEL: CPT

## 2024-10-08 PROCEDURE — 83540 ASSAY OF IRON: CPT

## 2024-10-08 PROCEDURE — 82728 ASSAY OF FERRITIN: CPT

## 2024-10-08 PROCEDURE — 85025 COMPLETE CBC W/AUTO DIFF WBC: CPT

## 2024-10-08 PROCEDURE — 82652 VIT D 1 25-DIHYDROXY: CPT

## 2024-10-08 PROCEDURE — 36415 COLL VENOUS BLD VENIPUNCTURE: CPT

## 2024-10-08 PROCEDURE — 80053 COMPREHEN METABOLIC PANEL: CPT

## 2024-10-08 PROCEDURE — 83550 IRON BINDING TEST: CPT

## 2024-10-08 PROCEDURE — 84439 ASSAY OF FREE THYROXINE: CPT

## 2024-10-09 DIAGNOSIS — R53.83 OTHER FATIGUE: Primary | ICD-10-CM

## 2024-10-09 DIAGNOSIS — E78.5 DYSLIPIDEMIA: ICD-10-CM

## 2024-10-09 LAB — 1,25(OH)2D SERPL-MCNC: 106 PG/ML (ref 5–200)

## 2024-10-15 ENCOUNTER — OFFICE VISIT (OUTPATIENT)
Dept: URGENT CARE | Facility: CLINIC | Age: 64
End: 2024-10-15
Payer: COMMERCIAL

## 2024-10-15 VITALS
DIASTOLIC BLOOD PRESSURE: 61 MMHG | OXYGEN SATURATION: 99 % | SYSTOLIC BLOOD PRESSURE: 123 MMHG | TEMPERATURE: 97.8 F | HEART RATE: 90 BPM | HEIGHT: 62 IN | RESPIRATION RATE: 12 BRPM | BODY MASS INDEX: 33.31 KG/M2 | WEIGHT: 181 LBS

## 2024-10-15 DIAGNOSIS — J06.9 VIRAL URI WITH COUGH: Primary | ICD-10-CM

## 2024-10-15 PROCEDURE — G0382 LEV 3 HOSP TYPE B ED VISIT: HCPCS | Performed by: STUDENT IN AN ORGANIZED HEALTH CARE EDUCATION/TRAINING PROGRAM

## 2024-10-15 PROCEDURE — S9083 URGENT CARE CENTER GLOBAL: HCPCS | Performed by: STUDENT IN AN ORGANIZED HEALTH CARE EDUCATION/TRAINING PROGRAM

## 2024-10-15 RX ORDER — BROMPHENIRAMINE MALEATE, PSEUDOEPHEDRINE HYDROCHLORIDE, AND DEXTROMETHORPHAN HYDROBROMIDE 2; 30; 10 MG/5ML; MG/5ML; MG/5ML
5 SYRUP ORAL 4 TIMES DAILY PRN
Qty: 120 ML | Refills: 0 | Status: SHIPPED | OUTPATIENT
Start: 2024-10-15

## 2024-10-15 NOTE — PROGRESS NOTES
[facility]        NAME: James Hudson is a 64 y.o. female  : 1960    MRN: [id number]  DATE: October 15, 2024  TIME: 4:45 PM    Assessment and Orders   Viral URI with cough [J06.9]  1. Viral URI with cough  brompheniramine-pseudoephedrine-DM 30-2-10 MG/5ML syrup            Plan and Discussion      Symptoms and exam consistent with viral URI. Will treat with oral bromfed. Discussed supportive care.   Risks and benefits discussed. Patient understands and agrees with the plan.     PATIENT INSTRUCTIONS    tests have been performed at [facility], our office will contact you with results if changes need to be made to the care plan discussed with you at the visit.  [facility]    Follow up with PCP.     If any of the following occur, please report to your nearest ED for evaluation or call 911.   Difficultly breathing or shortness of breath  Chest pain  Acutely worsening symptoms.         Chief Complaint     Chief Complaint   Patient presents with    Cold Like Symptoms     Worsening cough and congestion with post nasal drip. S/S started approx 3-4 days ago. Pt denies any fever.          History of Present Illness       Cough  This is a new problem. The current episode started in the past 7 days. The problem has been gradually worsening. The cough is Productive of sputum. Associated symptoms include nasal congestion, postnasal drip and a sore throat. Pertinent negatives include no chest pain, ear pain, fever, shortness of breath or wheezing. There is no history of asthma or COPD.       Review of Systems   Review of Systems   Constitutional:  Negative for fever.   HENT:  Positive for congestion, postnasal drip, sore throat and voice change. Negative for ear pain.    Respiratory:  Positive for cough. Negative for shortness of breath and wheezing.    Cardiovascular:  Negative for chest pain.         Current Medications       Current Outpatient Medications:      brompheniramine-pseudoephedrine-DM 30-2-10 MG/5ML syrup, Take 5 mL by mouth 4 (four) times a day as needed for allergies, congestion or cough, Disp: 120 mL, Rfl: 0    atorvastatin (LIPITOR) 20 mg tablet, TAKE ONE TABLET BY MOUTH DAILY, Disp: 90 tablet, Rfl: 1    celecoxib (CeleBREX) 200 mg capsule, Take 1 capsule (200 mg total) by mouth daily, Disp: 30 capsule, Rfl: 1    estradiol (Vivelle-Dot) 0.075 MG/24HR, Place 1 patch on the skin once a week, Disp: 8 patch, Rfl: 3    Semaglutide-Weight Management (WEGOVY) 0.25 MG/0.5ML, Inject 0.5 mL (0.25 mg total) under the skin once a week, Disp: 2 mL, Rfl: 0    triamcinolone (KENALOG) 0.1 % cream, Apply topically 2 (two) times a day, Disp: 80 g, Rfl: 0    XERESE 5-1 % CREA, once as needed, Disp: , Rfl:     Current Facility-Administered Medications:     denosumab (PROLIA) subcutaneous injection 60 mg, 60 mg, Subcutaneous, Q6 Months, , 60 mg at 24 1639    Current Allergies     Allergies as of 10/15/2024    (No Known Allergies)            The following portions of the patient's history were reviewed and updated as appropriate: allergies, current medications, past family history, past medical history, past social history, past surgical history and problem list.     Past Medical History:   Diagnosis Date    Abnormal ECG     Arthritis     Cancer (HCC)     hydocarcinoma- right hand    Chest pain     Colon polyp     History of mammogram 2017    Hyperlipidemia     Osteopenia     Sleep apnea     dental device       Past Surgical History:   Procedure Laterality Date     SECTION      COLONOSCOPY      CYST REMOVAL Right     RIGHT HAND    ECTOPIC PREGNANCY SURGERY      HAND SURGERY Right     HERNIA REPAIR      KNEE SURGERY      KNEE ARTHRODESIS    RIGHT    LYMPH NODE BIOPSY      OOPHORECTOMY      ROTATOR CUFF REPAIR      X2    TOTAL ABDOMINAL HYSTERECTOMY         Family History   Problem Relation Age of Onset    Cancer Mother         bladder    Arthritis Mother      "Hearing loss Mother     Stroke Mother     Heart disease Father         CARDIAC DISORDER    Cervical cancer Family     No Known Problems Sister     No Known Problems Daughter     No Known Problems Maternal Grandmother     No Known Problems Maternal Grandfather     No Known Problems Paternal Grandmother     No Known Problems Paternal Grandfather     No Known Problems Daughter     No Known Problems Paternal Aunt     No Known Problems Paternal Aunt     No Known Problems Maternal Aunt     No Known Problems Maternal Aunt          Medications have been verified.        Objective   /61   Pulse 90   Temp 97.8 °F (36.6 °C)   Resp 12   Ht 5' 2\" (1.575 m)   Wt 82.1 kg (181 lb)   SpO2 99%   BMI 33.11 kg/m²   No LMP recorded. Patient has had a hysterectomy.       Physical Exam     Physical Exam  Constitutional:       General: She is not in acute distress.     Appearance: She is not ill-appearing or toxic-appearing.   HENT:      Head: Normocephalic and atraumatic.      Right Ear: Tympanic membrane and external ear normal.      Left Ear: Tympanic membrane and external ear normal.      Nose: Congestion present.      Comments: Boggy nasal turbinates     Mouth/Throat:      Pharynx: Posterior oropharyngeal erythema present.      Comments: Cobblestone appearance in posterior pharynx  Cardiovascular:      Rate and Rhythm: Normal rate and regular rhythm.   Pulmonary:      Effort: Pulmonary effort is normal. No respiratory distress.      Breath sounds: No wheezing.   Neurological:      General: No focal deficit present.      Mental Status: She is alert and oriented to person, place, and time.   Psychiatric:         Mood and Affect: Mood normal.         Behavior: Behavior normal.         Thought Content: Thought content normal.         Judgment: Judgment normal.               Anupama Damon DO"

## 2024-10-15 NOTE — LETTER
October 15, 2024     Patient: James Hudson   YOB: 1960   Date of Visit: 10/15/2024       To Whom It May Concern:    It is my medical opinion that James Hudson may return to work on 10/17/2024 .    If you have any questions or concerns, please don't hesitate to call.         Sincerely,        Anupama Damon,     CC: No Recipients  
negative...

## 2024-10-29 ENCOUNTER — TELEPHONE (OUTPATIENT)
Age: 64
End: 2024-10-29

## 2024-10-29 DIAGNOSIS — N95.1 MENOPAUSAL SYMPTOMS: ICD-10-CM

## 2024-10-29 RX ORDER — ESTRADIOL 0.07 MG/D
1 FILM, EXTENDED RELEASE TRANSDERMAL 2 TIMES WEEKLY
Qty: 8 PATCH | Refills: 3 | Status: SHIPPED | OUTPATIENT
Start: 2024-10-31

## 2024-10-29 NOTE — TELEPHONE ENCOUNTER
Please clarify if you want the 2x weekly, and update directions to 2x week or if you wanted the Climara that is only 1x week.      Estradiol patch (Vivelle-Dot) ordered is a 2x weekly patch.  It is ordered for 1x weekly.    Estradiol patch Climara is a 1x weekly.      Call back and reference number noted in contacts.

## 2024-10-29 NOTE — TELEPHONE ENCOUNTER
Patient seen by Remington Thorne 08/30/24  States she is still on vivelle .075mg, but using 1 patch for 2 weeks. Refill   to be sent to [facility] pharmacy, [address], [place].  Annual scheduled 9/2/25

## 2024-10-29 NOTE — TELEPHONE ENCOUNTER
She was seen by Viry Cabrales on 8/30/24; Thank you for clarifying her use; please inform her the Rx has been sent to her pharmacy - the pharmacy wants it to read 1 patch 2x/week but she can continue using it the way she is (one patch every 2 weeks) and using the lowest effective dose for shortest duration of time like we discussed in her past.

## 2024-10-29 NOTE — TELEPHONE ENCOUNTER
I last saw patient 4/2023 - at that time she was on estradiol patch (Vivelle-Dot) 0.075 mg/day and using one patch/week. It is unclear from her most recent encounter/annual with a different provider what she is still taking/doing. Patient may be trying to wean off and extending one patch over the week instead of using 2 patches/week. Please reach out to the patient and verify which prescription she has at home, what she needs, and where to send the prescription to. Thank you

## 2024-11-20 ENCOUNTER — APPOINTMENT (OUTPATIENT)
Dept: LAB | Facility: MEDICAL CENTER | Age: 64
End: 2024-11-20
Payer: COMMERCIAL

## 2024-11-20 DIAGNOSIS — E78.5 DYSLIPIDEMIA: ICD-10-CM

## 2024-11-20 LAB
CHOLEST SERPL-MCNC: 160 MG/DL (ref ?–200)
HDLC SERPL-MCNC: 72 MG/DL
LDLC SERPL CALC-MCNC: 63 MG/DL (ref 0–100)
NONHDLC SERPL-MCNC: 88 MG/DL
TRIGL SERPL-MCNC: 123 MG/DL (ref ?–150)

## 2024-11-20 PROCEDURE — 36415 COLL VENOUS BLD VENIPUNCTURE: CPT

## 2024-11-20 PROCEDURE — 80061 LIPID PANEL: CPT

## 2024-11-21 ENCOUNTER — RESULTS FOLLOW-UP (OUTPATIENT)
Dept: FAMILY MEDICINE CLINIC | Facility: CLINIC | Age: 64
End: 2024-11-21

## 2024-11-21 DIAGNOSIS — E78.00 HYPERCHOLESTEREMIA: ICD-10-CM

## 2024-11-22 RX ORDER — ATORVASTATIN CALCIUM 20 MG/1
20 TABLET, FILM COATED ORAL DAILY
Qty: 90 TABLET | Refills: 1 | Status: SHIPPED | OUTPATIENT
Start: 2024-11-22

## 2025-02-13 ENCOUNTER — TELEPHONE (OUTPATIENT)
Dept: FAMILY MEDICINE CLINIC | Facility: CLINIC | Age: 65
End: 2025-02-13

## 2025-02-14 NOTE — TELEPHONE ENCOUNTER
Pt will need new Prior Auth for Prolia injecion. The auth expires 3/7/25 and her injection is scheduled for 3/27/25

## 2025-02-26 ENCOUNTER — TELEPHONE (OUTPATIENT)
Age: 65
End: 2025-02-26

## 2025-02-26 ENCOUNTER — APPOINTMENT (OUTPATIENT)
Dept: LAB | Facility: MEDICAL CENTER | Age: 65
End: 2025-02-26
Payer: COMMERCIAL

## 2025-02-26 DIAGNOSIS — R53.83 OTHER FATIGUE: ICD-10-CM

## 2025-02-26 LAB — TSH SERPL DL<=0.05 MIU/L-ACNC: 3.17 UIU/ML (ref 0.45–4.5)

## 2025-02-26 PROCEDURE — 36415 COLL VENOUS BLD VENIPUNCTURE: CPT

## 2025-02-26 PROCEDURE — 84443 ASSAY THYROID STIM HORMONE: CPT

## 2025-02-26 NOTE — TELEPHONE ENCOUNTER
Patient called in to follow up on lab results. Not reviewed. Once reviewed please advise patient. Thank you.

## 2025-02-27 ENCOUNTER — OFFICE VISIT (OUTPATIENT)
Dept: FAMILY MEDICINE CLINIC | Facility: CLINIC | Age: 65
End: 2025-02-27
Payer: COMMERCIAL

## 2025-02-27 VITALS
RESPIRATION RATE: 16 BRPM | OXYGEN SATURATION: 98 % | TEMPERATURE: 98.3 F | BODY MASS INDEX: 34.96 KG/M2 | DIASTOLIC BLOOD PRESSURE: 80 MMHG | HEART RATE: 74 BPM | WEIGHT: 190 LBS | HEIGHT: 62 IN | SYSTOLIC BLOOD PRESSURE: 122 MMHG

## 2025-02-27 DIAGNOSIS — E66.811 CLASS 1 OBESITY DUE TO EXCESS CALORIES WITH SERIOUS COMORBIDITY AND BODY MASS INDEX (BMI) OF 34.0 TO 34.9 IN ADULT: Primary | ICD-10-CM

## 2025-02-27 DIAGNOSIS — M81.0 AGE-RELATED OSTEOPOROSIS WITHOUT CURRENT PATHOLOGICAL FRACTURE: ICD-10-CM

## 2025-02-27 DIAGNOSIS — E66.09 CLASS 1 OBESITY DUE TO EXCESS CALORIES WITH SERIOUS COMORBIDITY AND BODY MASS INDEX (BMI) OF 34.0 TO 34.9 IN ADULT: Primary | ICD-10-CM

## 2025-02-27 PROCEDURE — 99213 OFFICE O/P EST LOW 20 MIN: CPT | Performed by: FAMILY MEDICINE

## 2025-02-27 RX ORDER — VALACYCLOVIR HYDROCHLORIDE 1 G/1
TABLET, FILM COATED ORAL
COMMUNITY
Start: 2025-02-11

## 2025-02-27 RX ORDER — TIRZEPATIDE 2.5 MG/.5ML
2.5 INJECTION, SOLUTION SUBCUTANEOUS WEEKLY
Qty: 2 ML | Refills: 0 | Status: SHIPPED | OUTPATIENT
Start: 2025-02-27 | End: 2025-03-27

## 2025-02-27 NOTE — PROGRESS NOTES
Name: James Hudson      : 1960      MRN: [id number]  Encounter Provider: Zuri Palencia MD  Encounter Date: 2025   Encounter department: [facility] MEDICINE  :  Assessment & Plan  Class 1 obesity due to excess calories with serious comorbidity and body mass index (BMI) of 34.0 to 34.9 in adult  Prior Authorization Clinical Questions for Weight Management Pharmacotherapy    1. Does the patient have a contrainidcation to medication prescribed for weight management?: No  2. Does the patient have a diagnosis of obesity, confirmed by a BMI greater than or equal to 30 kg/m^2?: Yes  3. Does the patient have a BMI of greater than or equal to 27 kg/m^2 with at least one weight-related comorbidity/risk factor/complication (e.g. diabetes, dyslipidemia, coronary artery disease)?: Yes  4. Weight-related co-morbidities/risk factors: dyslipidemia, hypertension, obstructive sleep apnea (ANUSHA)  5. WEGOVY CVA Indication: Does patient have established documented cardiovascular disease (history of a prior heart attack (myocardial infarction), stroke, or symptomatic peripheral arterial disease (PAD)?: No  6. ZEPBOUND ANUSHA Indication: Does patient have documented ANUSHA diagnosed via sleep study (insurance will require copy of sleep study results for approval)?: No  7. Has the patient been on a weight loss regimen of low-calorie diet, increased physical activity, and lifestyle modifications for a minimum of 6 months?: Yes  8. Has the patient completed a comprehensive weight loss program (ie, Weight Watchers, Noom, Bariatrics, other larry on phone)? If so, what?: Yes  9. Does the patient have a history of type 2 diabetes?: No  10. Has the member tried and failed other weight loss medication within the past 12 months?: No  11. Will the member use requested medication in combination with another GLP agonist or weight loss drug?: No  12. Is the medication a controlled substance?: No     Baseline weight (in  "pounds): 190 lbs  Current weight (in pounds): 190 lbs  Weight loss percentage: 0%       Discussion on diet and exercise guidelines for weight loss and  health reviewed with pt       Orders:  •  tirzepatide (Zepbound) 2.5 mg/0.5 mL auto-injector; Inject 0.5 mL (2.5 mg total) under the skin once a week for 28 days    Age-related osteoporosis without current pathological fracture  Prolia  this month              History of Present Illness   Pt here or discussion on weight gaining wishes to go on glp-1  was unable to get ozempic last week       Review of Systems    Objective   /80 (BP Location: Left arm, Patient Position: Sitting, Cuff Size: Standard)   Pulse 74   Temp 98.3 °F (36.8 °C) (Tympanic)   Resp 16   Ht 5' 2\" (1.575 m)   Wt 86.2 kg (190 lb)   SpO2 98%   BMI 34.75 kg/m²      Physical Exam    "

## 2025-02-27 NOTE — ASSESSMENT & PLAN NOTE
Prior Authorization Clinical Questions for Weight Management Pharmacotherapy    1. Does the patient have a contrainidcation to medication prescribed for weight management?: No  2. Does the patient have a diagnosis of obesity, confirmed by a BMI greater than or equal to 30 kg/m^2?: Yes  3. Does the patient have a BMI of greater than or equal to 27 kg/m^2 with at least one weight-related comorbidity/risk factor/complication (e.g. diabetes, dyslipidemia, coronary artery disease)?: Yes  4. Weight-related co-morbidities/risk factors: dyslipidemia, hypertension, obstructive sleep apnea (ANUSHA)  5. WEGOVY CVA Indication: Does patient have established documented cardiovascular disease (history of a prior heart attack (myocardial infarction), stroke, or symptomatic peripheral arterial disease (PAD)?: No  6. ZEPBOUND ANUSHA Indication: Does patient have documented ANUSHA diagnosed via sleep study (insurance will require copy of sleep study results for approval)?: No  7. Has the patient been on a weight loss regimen of low-calorie diet, increased physical activity, and lifestyle modifications for a minimum of 6 months?: Yes  8. Has the patient completed a comprehensive weight loss program (ie, Weight Watchers, Noom, Bariatrics, other larry on phone)? If so, what?: Yes  9. Does the patient have a history of type 2 diabetes?: No  10. Has the member tried and failed other weight loss medication within the past 12 months?: No  11. Will the member use requested medication in combination with another GLP agonist or weight loss drug?: No  12. Is the medication a controlled substance?: No     Baseline weight (in pounds): 190 lbs  Current weight (in pounds): 190 lbs  Weight loss percentage: 0%       Discussion on diet and exercise guidelines for weight loss and  health reviewed with pt       Orders:  •  tirzepatide (Zepbound) 2.5 mg/0.5 mL auto-injector; Inject 0.5 mL (2.5 mg total) under the skin once a week for 28 days

## 2025-02-28 ENCOUNTER — TELEPHONE (OUTPATIENT)
Age: 65
End: 2025-02-28

## 2025-03-05 NOTE — TELEPHONE ENCOUNTER
Patient called in stated received message about Zepbound not being covered. Patient called in stated spole to insurance and the medication Contrave will be covered and would need a prior authorization if Dr. Palencia could place a script. Patient gave phone number for prior authorization for express script of [phone]. Patient would like a call back once once medication is sent to pharmacy.Thank you.

## 2025-03-07 NOTE — TELEPHONE ENCOUNTER
Prescription wont go through it is not on formulary according to her insurance does not work like zepbound or wegovy and weight is usually gained back after initial course

## 2025-03-12 NOTE — TELEPHONE ENCOUNTER
Pt called as she has not heard back from us. Providers message given. Pt will do some research on the [employer] web sites and [employer] for discounts.

## 2025-03-15 ENCOUNTER — HOSPITAL ENCOUNTER (OUTPATIENT)
Facility: HOSPITAL | Age: 65
Discharge: HOME | End: 2025-03-15
Payer: COMMERCIAL

## 2025-03-15 DIAGNOSIS — Z12.31 VISIT FOR SCREENING MAMMOGRAM: ICD-10-CM

## 2025-03-15 PROCEDURE — 77063 BREAST TOMOSYNTHESIS BI: CPT

## 2025-03-15 PROCEDURE — 77067 SCR MAMMO BI INCL CAD: CPT

## 2025-03-17 ENCOUNTER — RESULTS FOLLOW-UP (OUTPATIENT)
Dept: OBGYN CLINIC | Facility: CLINIC | Age: 65
End: 2025-03-17

## 2025-03-27 ENCOUNTER — CLINICAL SUPPORT (OUTPATIENT)
Dept: FAMILY MEDICINE CLINIC | Facility: CLINIC | Age: 65
End: 2025-03-27
Payer: COMMERCIAL

## 2025-03-27 DIAGNOSIS — M81.0 AGE-RELATED OSTEOPOROSIS WITHOUT CURRENT PATHOLOGICAL FRACTURE: Primary | ICD-10-CM

## 2025-03-27 PROCEDURE — 96372 THER/PROPH/DIAG INJ SC/IM: CPT | Performed by: FAMILY MEDICINE

## 2025-04-01 ENCOUNTER — HOSPITAL ENCOUNTER (OUTPATIENT)
Dept: ULTRASOUND IMAGING | Facility: CLINIC | Age: 65
Discharge: HOME | End: 2025-04-01
Payer: COMMERCIAL

## 2025-04-01 ENCOUNTER — OFFICE VISIT (OUTPATIENT)
Dept: URGENT CARE | Facility: CLINIC | Age: 65
End: 2025-04-01
Payer: COMMERCIAL

## 2025-04-01 ENCOUNTER — RESULTS FOLLOW-UP (OUTPATIENT)
Dept: OBGYN CLINIC | Facility: CLINIC | Age: 65
End: 2025-04-01

## 2025-04-01 VITALS
RESPIRATION RATE: 18 BRPM | DIASTOLIC BLOOD PRESSURE: 72 MMHG | TEMPERATURE: 97.3 F | OXYGEN SATURATION: 96 % | SYSTOLIC BLOOD PRESSURE: 116 MMHG | HEART RATE: 76 BPM

## 2025-04-01 DIAGNOSIS — M25.551 PAIN OF RIGHT HIP: ICD-10-CM

## 2025-04-01 DIAGNOSIS — R92.8 ABNORMAL MAMMOGRAM: ICD-10-CM

## 2025-04-01 DIAGNOSIS — M54.31 SCIATICA OF RIGHT SIDE: ICD-10-CM

## 2025-04-01 DIAGNOSIS — M46.1 SACROILIITIS (HCC): Primary | ICD-10-CM

## 2025-04-01 PROCEDURE — S9083 URGENT CARE CENTER GLOBAL: HCPCS | Performed by: NURSE PRACTITIONER

## 2025-04-01 PROCEDURE — G0382 LEV 3 HOSP TYPE B ED VISIT: HCPCS | Performed by: NURSE PRACTITIONER

## 2025-04-01 PROCEDURE — 76642 ULTRASOUND BREAST LIMITED: CPT

## 2025-04-01 RX ORDER — PREDNISONE 10 MG/1
TABLET ORAL
Qty: 30 TABLET | Refills: 0 | Status: SHIPPED | OUTPATIENT
Start: 2025-04-01

## 2025-04-01 NOTE — PROGRESS NOTES
"  [facility]        NAME: James Hudson is a 65 y.o. female  : 1960    MRN: [id number]  DATE: 2025  TIME: 3:39 PM    Assessment and Plan   Sacroiliitis (HCC) [M46.1]  1. Sacroiliitis (HCC)  predniSONE 10 mg tablet    Ambulatory referral to Physical Therapy      2. Sciatica of right side        3. Pain of right hip              Patient Instructions     Patient Instructions   --Take prednisone as prescribed. Take with food.    --Continue OTC Tylenol or Aleve as needed  --Consider ice/heat, OTC lidocaine patch  --Follow-up with PT and ortho if no improvement over the next week.  Go to ER if worsening.       If tests have been performed at [facility], our office will contact you with results if changes need to be made to the care plan discussed with you at the visit.  [facility]    Chief Complaint     Chief Complaint   Patient presents with    Hip Pain     2 weeks of right hip pain that radiates down the right leg.         History of Present Illness       Here with complaints of right \"hip\" pain x 2 weeks.   Aching, burning sensation felt right posterior hip/SI joint with radiation to buttock and down right leg to knee at times.  No radiation below knee or elsewhere.   Felt mainly when bending or when first standing.  No increased pain with ambulation.   Rates 0/10 at present. Aleve helps some.    No N/T/W. No bowel or bladder changes noted.   No known injuries or exacerbating activities.   Feels similar to prior episode last fall for which she saw ortho and had x-ray of hip showing mild arthritic changes.  Given prednisone taper which helped, as did course of PT. Referred to physiatrist also, but she never went because pain got better.   Denies prior hx of lower back pain/injuries including HNP.          Review of Systems   Review of Systems   Musculoskeletal:         Per HPI   Neurological:  Negative for weakness and numbness.         Current " Medications       Current Outpatient Medications:     atorvastatin (LIPITOR) 20 mg tablet, Take 1 tablet (20 mg total) by mouth daily, Disp: 90 tablet, Rfl: 1    estradiol (Vivelle-Dot) 0.075 MG/24HR, Place 1 patch on the skin 2 (two) times a week, Disp: 8 patch, Rfl: 3    predniSONE 10 mg tablet, TAKE  5 TAB DAILY X 2 DAYS, THEN 4 TAB DAILY X 2 DAYS, THEN 3 TAB DAILY X 2 DAYS, THEN 2 TAB DAILY X 2 DAYS, THEN 1 TAB DAILY X 2 DAYS, Disp: 30 tablet, Rfl: 0    valACYclovir (VALTREX) 1,000 mg tablet, , Disp: , Rfl:     XERESE 5-1 % CREA, once as needed (Patient not taking: Reported on 2025), Disp: , Rfl:     Current Facility-Administered Medications:     denosumab (PROLIA) subcutaneous injection 60 mg, 60 mg, Subcutaneous, Q6 Months, , 60 mg at 25 1620    Current Allergies     Allergies as of 2025    (No Known Allergies)            The following portions of the patient's history were reviewed and updated as appropriate: allergies, current medications, past family history, past medical history, past social history, past surgical history and problem list.     Past Medical History:   Diagnosis Date    Abnormal ECG     Arthritis     Cancer (HCC)     hydocarcinoma- right hand    Chest pain     Colon polyp     History of mammogram 2017    Hyperlipidemia     Osteopenia     Sleep apnea     dental device       Past Surgical History:   Procedure Laterality Date     SECTION      COLONOSCOPY      CYST REMOVAL Right     RIGHT HAND    ECTOPIC PREGNANCY SURGERY      HAND SURGERY Right     HERNIA REPAIR      KNEE SURGERY      KNEE ARTHRODESIS    RIGHT    LYMPH NODE BIOPSY      OOPHORECTOMY      ROTATOR CUFF REPAIR      X2    TOTAL ABDOMINAL HYSTERECTOMY         Family History   Problem Relation Age of Onset    Cancer Mother         bladder    Arthritis Mother     Hearing loss Mother     Stroke Mother     Heart disease Father         CARDIAC DISORDER    Cervical cancer Family     No Known Problems Sister      No Known Problems Daughter     No Known Problems Maternal Grandmother     No Known Problems Maternal Grandfather     No Known Problems Paternal Grandmother     No Known Problems Paternal Grandfather     No Known Problems Daughter     No Known Problems Paternal Aunt     No Known Problems Paternal Aunt     No Known Problems Maternal Aunt     No Known Problems Maternal Aunt          Medications have been verified.        Objective   /72 (BP Location: Right arm, Patient Position: Sitting, Cuff Size: Standard)   Pulse 76   Temp (!) 97.3 °F (36.3 °C) (Temporal)   Resp 18   SpO2 96%   No LMP recorded. Patient has had a hysterectomy.       Physical Exam     Physical Exam  Pulmonary:      Effort: Pulmonary effort is normal.   Musculoskeletal:         General: Tenderness present. No swelling or signs of injury. Normal range of motion.      Comments: Mild TTP overlying right SI joint and right trochanteric bursa with no visualized or palpable abnormalities.    Remainder of lower back, hip, and thigh non-tender with normal appearance including axial spine.    Normal spine ROM with reproduction of symptoms at limits of flexion.  Positive slump test.  Negative SLR.   Normal hip ROM.  Mildly positive LAMONTE and FADIR.  No increased pain with resisted flexion .   2+ patellar DTR's.   Normal gait.      Neurological:      General: No focal deficit present.      Mental Status: She is alert.   Psychiatric:         Mood and Affect: Mood normal.

## 2025-04-01 NOTE — PATIENT INSTRUCTIONS
--Take prednisone as prescribed. Take with food.    --Continue OTC Tylenol or Aleve as needed  --Consider ice/heat, OTC lidocaine patch  --Follow-up with PT and ortho if no improvement over the next week.  Go to ER if worsening.

## 2025-04-09 ENCOUNTER — OFFICE VISIT (OUTPATIENT)
Dept: OBGYN CLINIC | Facility: MEDICAL CENTER | Age: 65
End: 2025-04-09
Payer: COMMERCIAL

## 2025-04-09 VITALS — WEIGHT: 190 LBS | BODY MASS INDEX: 34.96 KG/M2 | HEIGHT: 62 IN

## 2025-04-09 DIAGNOSIS — G89.29 CHRONIC RIGHT-SIDED LOW BACK PAIN WITHOUT SCIATICA: Primary | ICD-10-CM

## 2025-04-09 DIAGNOSIS — M54.50 CHRONIC RIGHT-SIDED LOW BACK PAIN WITHOUT SCIATICA: Primary | ICD-10-CM

## 2025-04-09 PROCEDURE — 99204 OFFICE O/P NEW MOD 45 MIN: CPT | Performed by: PHYSICAL MEDICINE & REHABILITATION

## 2025-04-09 RX ORDER — MELOXICAM 15 MG/1
15 TABLET ORAL DAILY PRN
Qty: 60 TABLET | Refills: 0 | Status: SHIPPED | OUTPATIENT
Start: 2025-04-09 | End: 2025-04-16

## 2025-04-09 NOTE — PROGRESS NOTES
1. Chronic right-sided low back pain without sciatica      Orders Placed This Encounter   Procedures    Ambulatory referral to Physical Therapy     New Medications Ordered This Visit   Medications    meloxicam (MOBIC) 15 mg tablet     Sig: Take 1 tablet (15 mg total) by mouth daily as needed for moderate pain     Dispense:  60 tablet     Refill:  0       Impression:  Right lumbar pain likely secondary to sacroiliac joint dysfunction.  We discussed different treatment options and decided to proceed with physical therapy, meloxicam and topical patches.  No concerning neurological deficits.  The patient had PT in the past which was helpful.  We will see her back in 6 weeks if needed.  If still symptomatic, we will obtain lumbar spine x-rays on follow-up visit.    Imaging Studies (I personally reviewed images in PACS and report):  Right hip  x-rays most recent to this encounter reviewed.  These images show mild bilateral hip osteoarthritis as evidenced by over coverage of the lateral acetabulum.    Return in about 6 weeks (around 5/21/2025).    Patient is in agreement with the above plan.    HPI:  James Hudson is a 65 y.o. female  who presents for evaluation of   Chief Complaint   Patient presents with    Right Hip - Pain     Pt presents with chronic posterior right hip pain and lower back pain beginning in October without injury. Pt reports she did have numbness and pain down right leg at initial onset, but is not experiencing at this time. Pt would like to hold off on additional xrays if possible.    Lower Back - Pain     Onset/Mechanism: Chronic pain in the right low back.  She had some pain in the anterior hip but not having that any more.  Location: As above.  Radiation: Denies.  Provocative: This is worsened if not sitting down properly.  Going from sit to stand.  Severity: Painful.  Associated Symptoms: Denies.  Treatment so far: Prednisone which did not help..    Following history reviewed and updated:  Past  "Medical History:   Diagnosis Date    Abnormal ECG     Arthritis     Cancer (HCC)     hydocarcinoma- right hand    Chest pain     Colon polyp     History of mammogram 2017    Hyperlipidemia     Osteopenia     Sleep apnea     dental device     Past Surgical History:   Procedure Laterality Date     SECTION      COLONOSCOPY      CYST REMOVAL Right     RIGHT HAND    ECTOPIC PREGNANCY SURGERY      HAND SURGERY Right     HERNIA REPAIR      KNEE SURGERY      KNEE ARTHRODESIS    RIGHT    LYMPH NODE BIOPSY      OOPHORECTOMY      ROTATOR CUFF REPAIR      X2    TOTAL ABDOMINAL HYSTERECTOMY       Social History   Social History     Substance and Sexual Activity   Alcohol Use Yes    Alcohol/week: 2.0 standard drinks of alcohol    Types: 2 Standard drinks or equivalent per week    Comment: SOCIAL     Social History     Substance and Sexual Activity   Drug Use No     Social History     Tobacco Use   Smoking Status Former    Current packs/day: 0.00    Average packs/day: 0.5 packs/day for 10.0 years (5.0 ttl pk-yrs)    Types: Cigarettes    Start date: 1981    Quit date: 1991    Years since quittin.2   Smokeless Tobacco Never     Family History   Problem Relation Age of Onset    Cancer Mother         bladder    Arthritis Mother     Hearing loss Mother     Stroke Mother     Heart disease Father         CARDIAC DISORDER    Cervical cancer Family     No Known Problems Sister     No Known Problems Daughter     No Known Problems Maternal Grandmother     No Known Problems Maternal Grandfather     No Known Problems Paternal Grandmother     No Known Problems Paternal Grandfather     No Known Problems Daughter     No Known Problems Paternal Aunt     No Known Problems Paternal Aunt     No Known Problems Maternal Aunt     No Known Problems Maternal Aunt      No Known Allergies     Constitutional:  Ht 5' 2.01\" (1.575 m)   Wt 86.2 kg (190 lb)   BMI 34.74 kg/m²    General: NAD.  Eyes: Anicteric sclerae.  Neck: " Supple.  Lungs: Unlabored breathing.  Cardiovascular: No lower extremity edema.  Skin: Intact without erythema.  Neurologic: Sensation intact to light touch.  Psychiatric: Mood and affect are appropriate.    Back Exam     Tenderness   The patient is experiencing tenderness in the sacroiliac.    Range of Motion   Extension:  abnormal   Flexion:  abnormal     Muscle Strength   The patient has normal back strength.    Other   Erythema: no back redness  Scars: absent             Procedures

## 2025-04-15 ENCOUNTER — TELEPHONE (OUTPATIENT)
Age: 65
End: 2025-04-15

## 2025-04-15 NOTE — TELEPHONE ENCOUNTER
Caller: patient    Doctor: Dr. St     Reason for call: patient has been taking meloxicam and it Is not working at all for her, patient would like to know if there is anything else she can do for the pain in her lower back   Please advise     Call back#: [phone]

## 2025-04-16 DIAGNOSIS — M54.50 CHRONIC RIGHT-SIDED LOW BACK PAIN WITHOUT SCIATICA: Primary | ICD-10-CM

## 2025-04-16 DIAGNOSIS — G89.29 CHRONIC RIGHT-SIDED LOW BACK PAIN WITHOUT SCIATICA: Primary | ICD-10-CM

## 2025-04-16 RX ORDER — DICLOFENAC SODIUM 25 MG/1
25 TABLET, DELAYED RELEASE ORAL 2 TIMES DAILY PRN
Qty: 60 TABLET | Refills: 0 | Status: SHIPPED | OUTPATIENT
Start: 2025-04-16

## 2025-06-09 ENCOUNTER — TELEPHONE (OUTPATIENT)
Dept: FAMILY MEDICINE CLINIC | Facility: CLINIC | Age: 65
End: 2025-06-09

## 2025-06-09 ENCOUNTER — TELEPHONE (OUTPATIENT)
Age: 65
End: 2025-06-09

## 2025-06-09 DIAGNOSIS — M25.50 MULTIPLE JOINT PAIN: Primary | ICD-10-CM

## 2025-06-09 NOTE — TELEPHONE ENCOUNTER
Patient went to make an appointment with Rheum for her arthritis but they said she needed a referral from her pcp- asked if message could be sent as it is hard for her to get off work and come in for an appointment, please advise and contact patient via phone

## 2025-06-26 ENCOUNTER — CONSULT (OUTPATIENT)
Dept: RHEUMATOLOGY | Facility: CLINIC | Age: 65
End: 2025-06-26
Payer: COMMERCIAL

## 2025-06-26 VITALS
SYSTOLIC BLOOD PRESSURE: 126 MMHG | DIASTOLIC BLOOD PRESSURE: 90 MMHG | HEIGHT: 62 IN | BODY MASS INDEX: 35.15 KG/M2 | HEART RATE: 79 BPM | OXYGEN SATURATION: 97 % | WEIGHT: 191 LBS

## 2025-06-26 DIAGNOSIS — M25.50 MULTIPLE JOINT PAIN: ICD-10-CM

## 2025-06-26 PROCEDURE — 99244 OFF/OP CNSLTJ NEW/EST MOD 40: CPT | Performed by: STUDENT IN AN ORGANIZED HEALTH CARE EDUCATION/TRAINING PROGRAM

## 2025-06-26 NOTE — PATIENT INSTRUCTIONS
Fortunately, your joint symptoms are not consistent with any underlying autoimmune condition such as rheumatoid arthritis. Your symptoms, blood work and imaging are all suggestive of wear-and-tear osteoarthritis. Because this is not an autoimmune issue, you do not need to be on medications that suppress your immune system, and you do not need to continue following with a rheumatologist, as we do not manage osteoarthritis long-term.    Certain nerve pain medications sometimes help with osteoarthritis. These include gabapentin, pregabalin, cymbalta, amitriptyline, and savella. I would recommend discussing these options with your PCP. They may also help with your sciatica.

## 2025-06-26 NOTE — PROGRESS NOTES
Name: James Hudson      : 1960      MRN: [id number]  Encounter Provider: Camilo Escoto DO  Encounter Date: 2025   Encounter department: [facility]  :  Assessment & Plan  Multiple joint pain  James Hudson is a 65 y.o. female who presents as a referral for arthralgias which are generalized that worsens with overuse and is positional, which is consistent with osteoarthritis and is also supported by X-ray findings. No physical exam findings to suggest inflammatory arthritis. Patient was counseled to follow-up with primary care for NSAIDs and tylenol for pain relief and can also try physical therapy    Orders:    Ambulatory Referral to Rheumatology        History of Present Illness   HPI  James Hudson is a 65 y.o. female who presents as a referral for arthralgias. Patient reports pain in her shoulders, neck, lower back, hips, knees that worsens with overuse and also with positional changes. Minimal morning stiffness in the AM.     Took celebrex in the past which helped. Finds difficulty in trying to be active daily and unable to play hide and seek with her grandchildren. Also reports pain in her right knee radiates to right 2nd toes. Previously got steroid injections and gel shots with Orthopedics with mixed benefit.    History obtained from: patient    Review of Systems  Rest of ROS are negative except as noted in HPI    Medical History Reviewed by provider this encounter:  Meds     .  Past Medical History   Past Medical History[1]  Past Surgical History[2]  Family History[3]   reports that she quit smoking about 34 years ago. Her smoking use included cigarettes. She started smoking about 44 years ago. She has a 5 pack-year smoking history. She has never used smokeless tobacco. She reports current alcohol use of about 2.0 standard drinks of alcohol per week. She reports that she does not use drugs.  Current Outpatient Medications   Medication Instructions     "atorvastatin (LIPITOR) 20 mg, Oral, Daily    estradiol (Vivelle-Dot) 0.075 MG/24HR 1 patch, Transdermal, 2 times weekly    valACYclovir (VALTREX) 1,000 mg tablet     XERESE 5-1 % CREA Once as needed   Allergies[4]   Medications Ordered Prior to Encounter[5]   Social History[6]     Objective   /90   Pulse 79   Ht 5' 2\" (1.575 m)   Wt 86.6 kg (191 lb)   SpO2 97%   BMI 34.93 kg/m²      Physical Exam  Constitutional:       Appearance: Normal appearance.   HENT:      Head: Normocephalic and atraumatic.   Pulmonary:      Effort: Pulmonary effort is normal.     Musculoskeletal:         General: No swelling, tenderness or deformity.      Comments: Mild lump posterior lump in distal leg on right side which is non-tender     Skin:     Findings: No rash.     Neurological:      Mental Status: She is alert and oriented to person, place, and time.                [1]   Past Medical History:  Diagnosis Date    Abnormal ECG     Arthritis     Cancer (HCC)     hydocarcinoma- right hand    Chest pain     Colon polyp     History of mammogram 2017    Hyperlipidemia     Osteopenia     Sleep apnea     dental device   [2]   Past Surgical History:  Procedure Laterality Date     SECTION      COLONOSCOPY      CYST REMOVAL Right     RIGHT HAND    ECTOPIC PREGNANCY SURGERY      HAND SURGERY Right     HERNIA REPAIR      KNEE SURGERY      KNEE ARTHRODESIS    RIGHT    LYMPH NODE BIOPSY      OOPHORECTOMY      ROTATOR CUFF REPAIR      X2    TOTAL ABDOMINAL HYSTERECTOMY     [3]   Family History  Problem Relation Name Age of Onset    Cancer Mother Mother         bladder    Arthritis Mother Mother     Hearing loss Mother Mother     Stroke Mother Mother     Heart disease Father          CARDIAC DISORDER    Cervical cancer Family      No Known Problems Sister      No Known Problems Daughter      No Known Problems Maternal Grandmother      No Known Problems Maternal Grandfather      No Known Problems Paternal Grandmother      No " Known Problems Paternal Grandfather      No Known Problems Daughter      No Known Problems Paternal Aunt      No Known Problems Paternal Aunt      No Known Problems Maternal Aunt      No Known Problems Maternal Aunt     [4] No Known Allergies  [5]   Current Outpatient Medications on File Prior to Visit   Medication Sig Dispense Refill    atorvastatin (LIPITOR) 20 mg tablet Take 1 tablet (20 mg total) by mouth daily 90 tablet 1    estradiol (Vivelle-Dot) 0.075 MG/24HR Place 1 patch on the skin 2 (two) times a week 8 patch 3    valACYclovir (VALTREX) 1,000 mg tablet  (Patient taking differently: Take by mouth as needed)      XERESE 5-1 % CREA once as needed (Patient not taking: Reported on 2025)      [DISCONTINUED] diclofenac sodium (VOLTAREN) 25 MG EC tablet Take 1 tablet (25 mg total) by mouth 2 (two) times a day as needed (Pain) Take with food.  Do not take any other NSAIDs. (Patient not taking: Reported on 2025) 60 tablet 0    [DISCONTINUED] predniSONE 10 mg tablet TAKE  5 TAB DAILY X 2 DAYS, THEN 4 TAB DAILY X 2 DAYS, THEN 3 TAB DAILY X 2 DAYS, THEN 2 TAB DAILY X 2 DAYS, THEN 1 TAB DAILY X 2 DAYS (Patient not taking: Reported on 2025) 30 tablet 0     Current Facility-Administered Medications on File Prior to Visit   Medication Dose Route Frequency Provider Last Rate Last Admin    denosumab (PROLIA) subcutaneous injection 60 mg  60 mg Subcutaneous Q6 Months    60 mg at 25 1620   [6]   Social History  Tobacco Use    Smoking status: Former     Current packs/day: 0.00     Average packs/day: 0.5 packs/day for 10.0 years (5.0 ttl pk-yrs)     Types: Cigarettes     Start date: 1981     Quit date: 1991     Years since quittin.5    Smokeless tobacco: Never   Vaping Use    Vaping status: Never Used   Substance and Sexual Activity    Alcohol use: Yes     Alcohol/week: 2.0 standard drinks of alcohol     Types: 2 Standard drinks or equivalent per week     Comment: SOCIAL    Drug use: No     Sexual activity: Yes     Partners: Male     Birth control/protection: Post-menopausal